# Patient Record
Sex: MALE | Race: BLACK OR AFRICAN AMERICAN | Employment: FULL TIME | URBAN - METROPOLITAN AREA
[De-identification: names, ages, dates, MRNs, and addresses within clinical notes are randomized per-mention and may not be internally consistent; named-entity substitution may affect disease eponyms.]

---

## 2017-01-27 ENCOUNTER — HOSPITAL ENCOUNTER (EMERGENCY)
Age: 22
Discharge: HOME | End: 2017-01-27
Attending: FAMILY MEDICINE

## 2017-01-27 VITALS
RESPIRATION RATE: 18 BRPM | WEIGHT: 270 LBS | HEIGHT: 76 IN | BODY MASS INDEX: 32.88 KG/M2 | HEART RATE: 54 BPM | TEMPERATURE: 98 F | SYSTOLIC BLOOD PRESSURE: 140 MMHG | OXYGEN SATURATION: 98 % | DIASTOLIC BLOOD PRESSURE: 81 MMHG

## 2017-01-27 DIAGNOSIS — R19.7 DIARRHEA, UNSPECIFIED TYPE: Primary | ICD-10-CM

## 2017-01-27 RX ORDER — DIPHENOXYLATE HYDROCHLORIDE AND ATROPINE SULFATE 2.5; .025 MG/1; MG/1
1 TABLET ORAL
Qty: 20 TAB | Refills: 0 | Status: SHIPPED | OUTPATIENT
Start: 2017-01-27 | End: 2017-10-28

## 2017-01-27 NOTE — LETTER
[facility] 
[address] [place] [address] 
[phone] Work/School Note Date: 1/27/2017 To Whom It May concern: 
 
Jennifer Campo was seen and treated today in the emergency room by the following provider(s): 
Attending Provider: Georgette Garcia MD 
Physician Assistant: Nelson Ashraf. Jennifer Campo may return to work on 01/30/17. Sincerely, Nelson Ashraf

## 2017-01-27 NOTE — UC PROVIDER NOTE
Patient is a 25 y.o. male presenting with abdominal pain and diarrhea. The history is provided by the patient. Abdominal Pain    This is a new problem. The current episode started 6 to 12 hours ago. The problem occurs hourly. The problem has been gradually improving. The pain is associated with an unknown factor. The pain is located in the epigastric region. Associated symptoms include diarrhea. Nothing worsens the pain. Past workup includes no CT scan, no ultrasound. His past medical history does not include PUD, gallstones, ulcerative colitis or Crohn's disease. Diarrhea    This is a new problem. The current episode started 6 to 12 hours ago. The problem occurs hourly. The problem has been gradually improving. The pain is associated with an unknown factor. The pain is located in the epigastric region. Associated symptoms include diarrhea. Nothing worsens the pain. The pain is relieved by nothing. Past workup includes no CT scan, no ultrasound. His past medical history does not include PUD, gallstones, ulcerative colitis or Crohn's disease. Past Medical History   Diagnosis Date    Asthma      history of asthma    Recurrent right knee instability 2/23/2015    Tobacco use disorder 2/18/2015        Past Surgical History   Procedure Laterality Date    Hx wrist fracture tx Left      2011         Family History   Problem Relation Age of Onset    Diabetes Sister     Elevated Lipids Sister     Diabetes Maternal Grandmother     Diabetes Maternal Grandfather     Hypertension Maternal Grandfather     No Known Problems Mother     No Known Problems Father         Social History     Social History    Marital status: SINGLE     Spouse name: N/A    Number of children: N/A    Years of education: N/A     Occupational History    Not on file.      Social History Main Topics    Smoking status: Current Some Day Smoker     Types: Cigarettes, Cigars    Smokeless tobacco: Never Used    Alcohol use 0.0 oz/week 0 Standard drinks or equivalent per week      Comment: social    Drug use: No    Sexual activity: Yes     Partners: Female     Other Topics Concern    Not on file     Social History Narrative                ALLERGIES: Review of patient's allergies indicates no known allergies. Review of Systems   Constitutional: Negative for appetite change. Respiratory: Negative for chest tightness. Gastrointestinal: Positive for abdominal pain and diarrhea. Vitals:    01/27/17 1107   BP: 140/81   Pulse: (!) 54   Resp: 18   Temp: 98 °F (36.7 °C)   SpO2: 98%   Weight: 122.5 kg (270 lb)   Height: 6' 4\" (1.93 m)       Physical Exam   Constitutional: He is oriented to person, place, and time. He appears well-developed and well-nourished. Cardiovascular: Normal rate and regular rhythm. Pulmonary/Chest: Effort normal and breath sounds normal.   Abdominal: Soft. Bowel sounds are normal. He exhibits no distension and no mass. There is no tenderness. There is no rebound and no guarding. Neurological: He is alert and oriented to person, place, and time. Skin: Skin is warm and dry. Psychiatric: He has a normal mood and affect. His behavior is normal. Judgment and thought content normal.   Nursing note and vitals reviewed. MDM     Differential Diagnosis; Clinical Impression; Plan:     CLINICAL IMPRESSION:  Diarrhea, unspecified type  (primary encounter diagnosis)    Plan:  1. Lomotil   2.   3.   Risk of Significant Complications, Morbidity, and/or Mortality:   Presenting problems: Moderate  Diagnostic procedures: Moderate  Management options:   Moderate  Progress:   Patient progress:  Stable      Procedures

## 2017-01-27 NOTE — DISCHARGE INSTRUCTIONS
Diarrhea: Care Instructions  Your Care Instructions    Diarrhea is loose, watery stools (bowel movements). The exact cause is often hard to find. Sometimes diarrhea is your body's way of getting rid of what caused an upset stomach. Viruses, food poisoning, and many medicines can cause diarrhea. Some people get diarrhea in response to emotional stress, anxiety, or certain foods. Almost everyone has diarrhea now and then. It usually isn't serious, and your stools will return to normal soon. The important thing to do is replace the fluids you have lost, so you can prevent dehydration. The doctor has checked you carefully, but problems can develop later. If you notice any problems or new symptoms, get medical treatment right away. Follow-up care is a key part of your treatment and safety. Be sure to make and go to all appointments, and call your doctor if you are having problems. It's also a good idea to know your test results and keep a list of the medicines you take. How can you care for yourself at home? · Watch for signs of dehydration, which means your body has lost too much water. Dehydration is a serious condition and should be treated right away. Signs of dehydration are:  ¨ Increasing thirst and dry eyes and mouth. ¨ Feeling faint or lightheaded. ¨ Darker urine, and a smaller amount of urine than normal.  · To prevent dehydration, drink plenty of fluids, enough so that your urine is light yellow or clear like water. Choose water and other caffeine-free clear liquids until you feel better. If you have kidney, heart, or liver disease and have to limit fluids, talk with your doctor before you increase the amount of fluids you drink. · Begin eating small amounts of mild foods the next day, if you feel like it. ¨ Try yogurt that has live cultures of Lactobacillus. (Check the label.)  ¨ Avoid spicy foods, fruits, alcohol, and caffeine until 48 hours after all symptoms are gone.   ¨ Avoid chewing gum that contains sorbitol. ¨ Avoid dairy products (except for yogurt with Lactobacillus) while you have diarrhea and for 3 days after symptoms are gone. · The doctor may recommend that you take over-the-counter medicine, such as loperamide (Imodium), if you still have diarrhea after 6 hours. Read and follow all instructions on the label. Do not use this medicine if you have bloody diarrhea, a high fever, or other signs of serious illness. Call your doctor if you think you are having a problem with your medicine. When should you call for help? Call 911 anytime you think you may need emergency care. For example, call if:  · You passed out (lost consciousness). · Your stools are maroon or very bloody. Call your doctor now or seek immediate medical care if:  · You are dizzy or lightheaded, or you feel like you may faint. · Your stools are black and look like tar, or they have streaks of blood. · You have new or worse belly pain. · You have symptoms of dehydration, such as:  ¨ Dry eyes and a dry mouth. ¨ Passing only a little dark urine. ¨ Feeling thirstier than usual.  · You have a new or higher fever. Watch closely for changes in your health, and be sure to contact your doctor if:  · Your diarrhea is getting worse. · You see pus in the diarrhea. · You are not getting better after 2 days (48 hours). Where can you learn more? Go to http://emilie-levy.info/. Enter N337 in the search box to learn more about \"Diarrhea: Care Instructions. \"  Current as of: May 27, 2016  Content Version: 11.1  © 3846-0290 [employer]. Care instructions adapted under license by [employer] (which disclaims liability or warranty for this information). If you have questions about a medical condition or this instruction, always ask your healthcare professional. [address] any warranty or liability for your use of this information.

## 2017-01-30 LAB
BILIRUB UR QL: NEGATIVE
GLUCOSE UR QL STRIP.AUTO: NEGATIVE MG/DL
KETONES UR-MCNC: NEGATIVE MG/DL
LEUKOCYTE ESTERASE UR QL STRIP: NEGATIVE
NITRITE UR QL: NEGATIVE
PH UR: 6 [PH] (ref 5–8)
PROT UR QL: NEGATIVE MG/DL
RBC # UR STRIP: NEGATIVE /UL
SP GR UR: 1.02 (ref 1–1.03)
UROBILINOGEN UR QL: 0.2 EU/DL (ref 0.2–1)

## 2017-04-10 ENCOUNTER — TELEPHONE (OUTPATIENT)
Dept: FAMILY MEDICINE CLINIC | Age: 22
End: 2017-04-10
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
## 2017-04-13 ENCOUNTER — OFFICE VISIT (OUTPATIENT)
Dept: FAMILY MEDICINE CLINIC | Age: 22
End: 2017-04-13

## 2017-04-13 VITALS
BODY MASS INDEX: 32.03 KG/M2 | RESPIRATION RATE: 24 BRPM | HEART RATE: 54 BPM | SYSTOLIC BLOOD PRESSURE: 112 MMHG | WEIGHT: 263 LBS | DIASTOLIC BLOOD PRESSURE: 64 MMHG | HEIGHT: 76 IN | TEMPERATURE: 98.3 F | OXYGEN SATURATION: 96 %
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
## 2017-04-13 DIAGNOSIS — E78.5 HYPERLIPIDEMIA, UNSPECIFIED HYPERLIPIDEMIA TYPE: ICD-10-CM

## 2017-04-13 DIAGNOSIS — J30.89 NON-SEASONAL ALLERGIC RHINITIS, UNSPECIFIED ALLERGIC RHINITIS TRIGGER: ICD-10-CM

## 2017-04-13 DIAGNOSIS — S22.000D THORACIC COMPRESSION FRACTURE, WITH ROUTINE HEALING, SUBSEQUENT ENCOUNTER: ICD-10-CM

## 2017-04-13 DIAGNOSIS — G47.00 INSOMNIA, UNSPECIFIED TYPE: ICD-10-CM

## 2017-04-13 DIAGNOSIS — R10.13 ABDOMINAL PAIN, EPIGASTRIC: ICD-10-CM

## 2017-04-13 DIAGNOSIS — F43.22 ADJUSTMENT DISORDER WITH ANXIOUS MOOD: ICD-10-CM

## 2017-04-13 RX ORDER — CITALOPRAM 10 MG/1
10 TABLET ORAL DAILY
Qty: 30 TAB | Refills: 3 | Status: SHIPPED | OUTPATIENT
Start: 2017-04-13 | End: 2018-01-11

## 2017-04-13 RX ORDER — OMEPRAZOLE 20 MG/1
20 CAPSULE, DELAYED RELEASE ORAL DAILY
Qty: 30 CAP | Refills: 2 | Status: SHIPPED | OUTPATIENT
Start: 2017-04-13 | End: 2018-01-11

## 2017-04-13 RX ORDER — OXYCODONE HYDROCHLORIDE 5 MG/1
TABLET ORAL
COMMUNITY
Start: 2017-04-08 | End: 2017-10-28

## 2017-04-13 RX ORDER — OXYCODONE AND ACETAMINOPHEN 5; 325 MG/1; MG/1
1 TABLET ORAL
Qty: 50 TAB | Refills: 0 | Status: SHIPPED | OUTPATIENT
Start: 2017-04-13 | End: 2017-05-08 | Stop reason: SDUPTHER

## 2017-04-13 RX ORDER — CETIRIZINE HCL 10 MG
10 TABLET ORAL
Qty: 30 TAB | Refills: 2 | Status: SHIPPED | OUTPATIENT
Start: 2017-04-13 | End: 2018-01-11

## 2017-04-13 RX ORDER — ATORVASTATIN CALCIUM 10 MG/1
10 TABLET, FILM COATED ORAL DAILY
Qty: 30 TAB | Refills: 5 | Status: SHIPPED | OUTPATIENT
Start: 2017-04-13 | End: 2018-01-11
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
## 2017-04-17 ENCOUNTER — OFFICE VISIT (OUTPATIENT)
Dept: FAMILY MEDICINE CLINIC | Age: 22
End: 2017-04-17

## 2017-04-17 VITALS
WEIGHT: 269 LBS | OXYGEN SATURATION: 98 % | TEMPERATURE: 98 F | SYSTOLIC BLOOD PRESSURE: 116 MMHG | BODY MASS INDEX: 32.76 KG/M2 | HEIGHT: 76 IN | RESPIRATION RATE: 22 BRPM | HEART RATE: 53 BPM | DIASTOLIC BLOOD PRESSURE: 70 MMHG
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
## 2017-04-17 DIAGNOSIS — S22.000S THORACIC COMPRESSION FRACTURE, SEQUELA: ICD-10-CM
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
## 2017-05-08 ENCOUNTER — TELEPHONE (OUTPATIENT)
Dept: FAMILY MEDICINE CLINIC | Age: 22
End: 2017-05-08

## 2017-05-08 DIAGNOSIS — S22.000D THORACIC COMPRESSION FRACTURE, WITH ROUTINE HEALING, SUBSEQUENT ENCOUNTER: ICD-10-CM

## 2017-05-08 RX ORDER — OXYCODONE AND ACETAMINOPHEN 5; 325 MG/1; MG/1
1 TABLET ORAL
Qty: 30 TAB | Refills: 0 | Status: SHIPPED | OUTPATIENT
Start: 2017-05-08 | End: 2018-01-11

## 2017-05-08 NOTE — TELEPHONE ENCOUNTER
----- Message from Owen Lynch sent at 5/8/2017 11:22 AM EDT -----  Regarding: Dr. Simran Murray   Patient needs a prescription refill on Oxycodone. Pt can be reached at [phone].

## 2017-05-17 ENCOUNTER — OFFICE VISIT (OUTPATIENT)
Dept: FAMILY MEDICINE CLINIC | Age: 22
End: 2017-05-17

## 2017-05-17 VITALS
OXYGEN SATURATION: 98 % | RESPIRATION RATE: 28 BRPM | SYSTOLIC BLOOD PRESSURE: 128 MMHG | HEART RATE: 83 BPM | BODY MASS INDEX: 31.51 KG/M2 | WEIGHT: 258.8 LBS | TEMPERATURE: 98 F | HEIGHT: 76 IN | DIASTOLIC BLOOD PRESSURE: 82 MMHG

## 2017-05-17 DIAGNOSIS — S22.000D THORACIC COMPRESSION FRACTURE, WITH ROUTINE HEALING, SUBSEQUENT ENCOUNTER: Primary | ICD-10-CM

## 2017-05-17 DIAGNOSIS — M17.31 POST-TRAUMATIC OSTEOARTHRITIS OF RIGHT KNEE: ICD-10-CM

## 2017-05-17 RX ORDER — IBUPROFEN 800 MG/1
800 TABLET ORAL
Qty: 50 TAB | Refills: 2 | Status: SHIPPED | OUTPATIENT
Start: 2017-05-17 | End: 2017-10-28

## 2017-05-17 NOTE — MR AVS SNAPSHOT
Visit Information Date & Time Provider Department Dept. Phone Encounter #  
 5/17/2017 11:00 AM Juan Dickey [phone] [id number] Follow-up Instructions Return in about 4 weeks (around 6/14/2017). Upcoming Health Maintenance Date Due  
 HPV AGE 9Y-34Y (1 of 3 - Male 3 Dose Series) 1/17/2007 INFLUENZA AGE 9 TO ADULT 8/1/2017 DTaP/Tdap/Td series (3 - Td) 4/8/2026 Allergies as of 5/17/2017  Review Complete On: 5/17/2017 By: Yuri Johnson No Known Allergies Current Immunizations  Never Reviewed Name Date Influenza Vaccine (Quad) PF 11/17/2016  2:30 PM  
 Tdap 4/8/2016  2:06 PM  
  
 Not reviewed this visit You Were Diagnosed With   
  
 Codes Comments Thoracic compression fracture, with routine healing, subsequent encounter    -  Primary ICD-10-CM: S22.000D ICD-9-CM: V54.17 Post-traumatic osteoarthritis of right knee     ICD-10-CM: M17.31 
ICD-9-CM: 715.26 Vitals BP Pulse Temp Resp Height(growth percentile) Weight(growth percentile) 128/82 (BP 1 Location: Left arm, BP Patient Position: Sitting) 83 98 °F (36.7 °C) (Oral) 28 6' 4\" (1.93 m) 258 lb 12.8 oz (117.4 kg) SpO2 BMI Smoking Status 98% 31.5 kg/m2 Current Some Day Smoker Vitals History BMI and BSA Data Body Mass Index Body Surface Area 31.5 kg/m 2 2.51 m 2 Preferred Pharmacy Pharmacy Name Phone [id number] [address] [phone] Your Updated Medication List  
  
   
This list is accurate as of: 5/17/17 11:25 AM.  Always use your most recent med list.  
  
  
  
  
 atorvastatin 10 mg tablet Commonly known as:  LIPITOR Take 1 Tab by mouth daily. cetirizine 10 mg tablet Commonly known as:  ZYRTEC Take 1 Tab by mouth daily as needed for Allergies. citalopram 10 mg tablet Commonly known as:  William Flores Take 1 Tab by mouth daily. diphenoxylate-atropine 2.5-0.025 mg per tablet Commonly known as:  LOMOTIL Take 1 Tab by mouth four (4) times daily as needed for Diarrhea (1 tab after each stool for max 8 per day). Max Daily Amount: 4 Tabs. Take after each stool for a maximum of 8 tablets daily  
  
 ibuprofen 800 mg tablet Commonly known as:  MOTRIN Take 1 Tab by mouth every eight (8) hours as needed for Pain. omeprazole 20 mg capsule Commonly known as:  PRILOSEC Take 1 Cap by mouth daily. oxyCODONE IR 5 mg immediate release tablet Commonly known as:  ROXICODONE  
  
 oxyCODONE-acetaminophen 5-325 mg per tablet Commonly known as:  PERCOCET Take 1 Tab by mouth every four (4) hours as needed for Pain. Max Daily Amount: 6 Tabs. traMADol 50 mg tablet Commonly known as:  ULTRAM  
TAKE 1 TABLET BY MOUTH NIGHTLY Prescriptions Sent to Pharmacy Refills  
 ibuprofen (MOTRIN) 800 mg tablet 2 Sig: Take 1 Tab by mouth every eight (8) hours as needed for Pain. Class: Normal  
 Pharmacy: [facility]/pharmacy [id number] [place] [address] S[address] Ph #: [phone] Route: Oral  
  
Follow-up Instructions Return in about 4 weeks (around 6/14/2017). Introducing [facility]! Dear Kate Goncalves: Thank you for requesting a [employer] account. Our records indicate that you already have an active [employer] account. You can access your account anytime at [email or web address] Did you know that you can access your hospital and ER discharge instructions at any time in [employer]? You can also review all of your test results from your hospital stay or ER visit. Additional Information If you have questions, please visit the Frequently Asked Questions section of the [employer] website at [email or web address] Remember, [employer] is NOT to be used for urgent needs. For medical emergencies, dial 911. Now available from your iPhone and Android! Please provide this summary of care documentation to your next provider. Your primary care clinician is listed as Lit Dunham. If you have any questions after today's visit, please call [phone].
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
## 2017-07-19 ENCOUNTER — TELEPHONE (OUTPATIENT)
Dept: FAMILY MEDICINE CLINIC | Age: 22
End: 2017-07-19

## 2017-07-19 RX ORDER — CHLORZOXAZONE 500 MG/1
500 TABLET ORAL
Qty: 40 TAB | Refills: 1 | Status: SHIPPED | OUTPATIENT
Start: 2017-07-19 | End: 2018-01-11

## 2017-07-19 NOTE — TELEPHONE ENCOUNTER
Patient is having back pain from [address] in April , he is unable to come in before 7/26/17 because of his work schedule. He has an appt on 7/26/17 3pm. He is requesting pain medication to help until his appt.  Please contact his mother Osbaldo Rios [phone]

## 2017-10-28 ENCOUNTER — HOSPITAL ENCOUNTER (OUTPATIENT)
Dept: LAB | Age: 22
Discharge: HOME | End: 2017-10-28

## 2017-10-28 ENCOUNTER — HOSPITAL ENCOUNTER (EMERGENCY)
Age: 22
Discharge: HOME | End: 2017-10-28
Attending: FAMILY MEDICINE

## 2017-10-28 VITALS
HEART RATE: 75 BPM | BODY MASS INDEX: 33.66 KG/M2 | DIASTOLIC BLOOD PRESSURE: 79 MMHG | OXYGEN SATURATION: 98 % | SYSTOLIC BLOOD PRESSURE: 164 MMHG | HEIGHT: 73 IN | TEMPERATURE: 98.5 F | WEIGHT: 254 LBS | RESPIRATION RATE: 18 BRPM

## 2017-10-28 DIAGNOSIS — Z11.3 ROUTINE SCREENING FOR STI (SEXUALLY TRANSMITTED INFECTION): ICD-10-CM

## 2017-10-28 DIAGNOSIS — Z20.2 EXPOSURE TO GONORRHEA: Primary | ICD-10-CM

## 2017-10-28 PROCEDURE — 87591 N.GONORRHOEAE DNA AMP PROB: CPT | Performed by: FAMILY MEDICINE

## 2017-10-28 RX ORDER — CEFTRIAXONE 250 MG/8ML
250 INJECTION, POWDER, FOR SOLUTION INTRAMUSCULAR; INTRAVENOUS EVERY 24 HOURS
Status: DISCONTINUED | OUTPATIENT
Start: 2017-10-28 | End: 2017-10-28 | Stop reason: HOSPADM

## 2017-10-28 RX ORDER — AZITHROMYCIN 1 G/1
1 POWDER, FOR SUSPENSION ORAL
Qty: 1 PACKET | Refills: 0 | Status: SHIPPED | OUTPATIENT
Start: 2017-10-28 | End: 2017-10-28

## 2017-10-28 RX ADMIN — CEFTRIAXONE 250 MG: 250 INJECTION, POWDER, FOR SOLUTION INTRAMUSCULAR; INTRAVENOUS at 17:47

## 2017-10-28 NOTE — DISCHARGE INSTRUCTIONS
Exposure to Sexually Transmitted Infections: Care Instructions  Your Care Instructions    Sexually transmitted infections (STIs) are those diseases spread by sexual contact. There are at least 20 different STIs, including chlamydia, gonorrhea, syphilis, and human immunodeficiency virus (HIV), which causes AIDS. Bacteria-caused STIs can be treated and cured. STIs caused by viruses, such as HIV, can be treated but not cured. Some STIs can reduce a woman's chances of getting pregnant in the future. STIs are spread during sexual contact, such as vaginal intercourse and oral or anal sex. Follow-up care is a key part of your treatment and safety. Be sure to make and go to all appointments, and call your doctor if you are having problems. It's also a good idea to know your test results and keep a list of the medicines you take. How can you care for yourself at home? · Your doctor may have given you a shot of antibiotics. If your doctor prescribed antibiotic pills, take them as directed. Do not stop taking them just because you feel better. You need to take the full course of antibiotics. · Do not have sexual contact while you have symptoms of an STI or are being treated for an STI. · Tell your sex partner (or partners) that he or she will need treatment. · If you are a woman, do not douche. Douching changes the normal balance of bacteria in the vagina and may spread an infection up into your reproductive organs. To prevent exposure to STIs in the future  · Use latex condoms every time you have sex. Use them from the beginning to the end of sexual contact. · Talk to your partner before you have sex. Find out if he or she has or is at risk for any STI. Keep in mind that a person may be able to spread an STI even if he or she does not have symptoms. · Do not have sex if you are being treated for an STI. · Do not have sex with anyone who has symptoms of an STI, such as sores on the genitals or mouth.   · Having one sex partner (who does not have STIs and does not have sex with anyone else) is a good way to avoid STIs. When should you call for help? Call your doctor now or seek immediate medical care if:  ? · You have new pain in your belly or pelvis. ? · You have symptoms of a urinary tract infection. These may include:  ¨ Pain or burning when you urinate. ¨ A frequent need to urinate without being able to pass much urine. ¨ Pain in the flank, which is just below the rib cage and above the waist on either side of the back. ¨ Blood in your urine. ¨ A fever. ? · You have new or worsening pain or swelling in the scrotum. ? Watch closely for changes in your health, and be sure to contact your doctor if:  ? · You have unusual vaginal bleeding. ? · You have a discharge from the vagina or penis. ? · You have any new symptoms, such as sores, bumps, rashes, blisters, or warts. ? · You have itching, tingling, pain, or burning in the genital or anal area. ? · You think you may have an STI. Where can you learn more? Go to http://emilie-levy.info/. Enter X046 in the search box to learn more about \"Exposure to Sexually Transmitted Infections: Care Instructions. \"  Current as of: March 20, 2017  Content Version: 11.4  © 1100-9282 [employer]. Care instructions adapted under license by [employer] (which disclaims liability or warranty for this information). If you have questions about a medical condition or this instruction, always ask your healthcare professional. [place] [address] any warranty or liability for your use of this information.
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
## 2017-10-30 LAB
C TRACH DNA SPEC QL NAA+PROBE: NEGATIVE
N GONORRHOEA DNA SPEC QL NAA+PROBE: NEGATIVE
SAMPLE TYPE: NORMAL
SERVICE CMNT-IMP: NORMAL
SPECIMEN SOURCE: NORMAL

## 2018-01-11 ENCOUNTER — HOSPITAL ENCOUNTER (EMERGENCY)
Age: 23
Discharge: HOME | End: 2018-01-11
Attending: FAMILY MEDICINE

## 2018-01-11 VITALS
RESPIRATION RATE: 18 BRPM | BODY MASS INDEX: 32.74 KG/M2 | HEART RATE: 63 BPM | HEIGHT: 73 IN | SYSTOLIC BLOOD PRESSURE: 139 MMHG | DIASTOLIC BLOOD PRESSURE: 70 MMHG | OXYGEN SATURATION: 98 % | TEMPERATURE: 98.4 F | WEIGHT: 247 LBS

## 2018-01-11 DIAGNOSIS — T14.8XXA BRUISE: Primary | ICD-10-CM

## 2018-01-25 NOTE — UC PROVIDER NOTE
Patient is a 26 y.o. male presenting with ecchymosis. The history is provided by the patient. Bleeding/Bruising   This is a new problem. The current episode started more than 2 days ago (noted on back of the thigh ). The problem occurs constantly. The problem has not changed since onset. Pertinent negatives include no chest pain, no abdominal pain and no shortness of breath. Nothing aggravates the symptoms. Nothing relieves the symptoms. He has tried nothing for the symptoms. Past Medical History:   Diagnosis Date    Asthma     history of asthma    Recurrent right knee instability 2/23/2015    Tobacco use disorder 2/18/2015        Past Surgical History:   Procedure Laterality Date    HX WRIST FRACTURE TX Left     2011         Family History   Problem Relation Age of Onset    Diabetes Sister     Elevated Lipids Sister     Diabetes Maternal Grandmother     Diabetes Maternal Grandfather     Hypertension Maternal Grandfather     No Known Problems Mother     No Known Problems Father         Social History     Social History    Marital status: SINGLE     Spouse name: N/A    Number of children: N/A    Years of education: N/A     Occupational History    Not on file. Social History Main Topics    Smoking status: Current Some Day Smoker     Types: Cigarettes, Cigars    Smokeless tobacco: Never Used    Alcohol use 0.0 oz/week     0 Standard drinks or equivalent per week      Comment: social    Drug use: No    Sexual activity: Yes     Partners: Female     Other Topics Concern    Not on file     Social History Narrative                ALLERGIES: Review of patient's allergies indicates no known allergies. Review of Systems   Respiratory: Negative for shortness of breath. Cardiovascular: Negative for chest pain. Gastrointestinal: Negative for abdominal pain. All other systems reviewed and are negative.       Vitals:    01/11/18 1630   BP: 139/70   Pulse: 63   Resp: 18   Temp: 98.4 °F (36.9 °C)   SpO2: 98%   Weight: 112 kg (247 lb)   Height: 6' 1\" (1.854 m)       Physical Exam   Constitutional: He is oriented to person, place, and time. He appears well-developed and well-nourished. HENT:   Head: Normocephalic and atraumatic. Right Ear: External ear normal.   Left Ear: External ear normal.   Mouth/Throat: Oropharynx is clear and moist. No oropharyngeal exudate. Eyes: Conjunctivae and EOM are normal. Pupils are equal, round, and reactive to light. Right eye exhibits no discharge. Left eye exhibits no discharge. No scleral icterus. Neck: Normal range of motion. Neck supple. No tracheal deviation present. No thyromegaly present. Cardiovascular: Normal rate, regular rhythm, normal heart sounds and intact distal pulses. No murmur heard. Pulmonary/Chest: Effort normal and breath sounds normal. No respiratory distress. He has no wheezes. He has no rales. Abdominal: Soft. Bowel sounds are normal. He exhibits no distension. There is no tenderness. There is no rebound and no guarding. Musculoskeletal: Normal range of motion. He exhibits no edema or tenderness. Lymphadenopathy:     He has no cervical adenopathy. Neurological: He is alert and oriented to person, place, and time. No cranial nerve deficit. Coordination normal.   Skin: Skin is warm. Bruising ( on posterior thigh- no blanching- no tenderness) noted. No abrasion, no petechiae and no rash noted. Psychiatric: He has a normal mood and affect. His behavior is normal. Judgment and thought content normal.   Nursing note and vitals reviewed.       MDM     Differential Diagnosis; Clinical Impression; Plan:     CLINICAL IMPRESSION:  Bruise  (primary encounter diagnosis)      DDX    Plan:    Reassured- observe  Needs lab with PCP   If sxs worsen go to Ed   Amount and/or Complexity of Data Reviewed:    Review and summarize past medical records:  Yes  Risk of Significant Complications, Morbidity, and/or Mortality:   Presenting problems: Moderate  Management options:   Moderate  Progress:   Patient progress:  Stable      Procedures

## 2018-05-12 ENCOUNTER — OFFICE VISIT (OUTPATIENT)
Dept: URGENT CARE | Age: 23
End: 2018-05-12

## 2018-05-12 VITALS
RESPIRATION RATE: 20 BRPM | WEIGHT: 229 LBS | SYSTOLIC BLOOD PRESSURE: 140 MMHG | HEART RATE: 60 BPM | OXYGEN SATURATION: 97 % | DIASTOLIC BLOOD PRESSURE: 60 MMHG | TEMPERATURE: 98 F | HEIGHT: 76 IN | BODY MASS INDEX: 27.89 KG/M2

## 2018-05-12 DIAGNOSIS — Z20.2 STD EXPOSURE: ICD-10-CM

## 2018-05-12 DIAGNOSIS — Z20.2 EXPOSURE TO GONORRHEA: Primary | ICD-10-CM

## 2018-05-12 RX ORDER — AZITHROMYCIN 250 MG/1
1000 TABLET, FILM COATED ORAL
Qty: 4 TAB | Refills: 0 | Status: SHIPPED | OUTPATIENT
Start: 2018-05-12 | End: 2018-05-12

## 2018-05-12 RX ORDER — CEFTRIAXONE 250 MG/8ML
250 INJECTION, POWDER, FOR SOLUTION INTRAMUSCULAR; INTRAVENOUS ONCE
Status: COMPLETED | OUTPATIENT
Start: 2018-05-12 | End: 2018-05-12

## 2018-05-12 RX ADMIN — CEFTRIAXONE 250 MG: 250 INJECTION, POWDER, FOR SOLUTION INTRAMUSCULAR; INTRAVENOUS at 17:03

## 2018-05-12 NOTE — PROGRESS NOTES
HPI Comments:   Girlfriend tested positive for gonorrhea. He has had unprotected sexual contact with her. Denies symptoms but is requesting treatment today. Patient is a 26 y.o. male presenting with STD exposure. Exposure to STD          Past Medical History:   Diagnosis Date    Asthma     history of asthma    Recurrent right knee instability 2/23/2015    Tobacco use disorder 2/18/2015        Past Surgical History:   Procedure Laterality Date    HX WRIST FRACTURE TX Left     2011         Family History   Problem Relation Age of Onset    Diabetes Sister     Elevated Lipids Sister     Diabetes Maternal Grandmother     Diabetes Maternal Grandfather     Hypertension Maternal Grandfather     No Known Problems Mother     No Known Problems Father         Social History     Social History    Marital status: SINGLE     Spouse name: N/A    Number of children: N/A    Years of education: N/A     Occupational History    Not on file. Social History Main Topics    Smoking status: Current Some Day Smoker     Types: Cigarettes, Cigars    Smokeless tobacco: Never Used    Alcohol use 0.0 oz/week     0 Standard drinks or equivalent per week      Comment: social    Drug use: No    Sexual activity: Yes     Partners: Female     Other Topics Concern    Not on file     Social History Narrative                ALLERGIES: Review of patient's allergies indicates no known allergies. Review of Systems   Constitutional: Negative for chills, fatigue and fever. Genitourinary: Negative for decreased urine volume, dysuria, hematuria, penile pain, scrotal swelling, testicular pain and urgency. Vitals:    05/12/18 1559   BP: 140/60   Pulse: 60   Resp: 20   Temp: 98 °F (36.7 °C)   SpO2: 97%   Weight: 229 lb (103.9 kg)   Height: 6' 4\" (1.93 m)       Physical Exam   Constitutional: He is oriented to person, place, and time. No distress.    HENT:   Mouth/Throat: Oropharynx is clear and moist.   Eyes: EOM are normal.   Cardiovascular: Normal rate, regular rhythm and normal heart sounds. Exam reveals no gallop and no friction rub. No murmur heard. Pulmonary/Chest: Effort normal and breath sounds normal.   Genitourinary:   Genitourinary Comments: Deferred  exam today   Neurological: He is alert and oriented to person, place, and time. Skin: He is not diaphoretic. MDM     Differential Diagnosis; Clinical Impression; Plan:       CLINICAL IMPRESSION:  (Z20.2) Exposure to gonorrhea  (primary encounter diagnosis)  (Z20.2) STD exposure    Orders Placed This Encounter      CHLAMYDIA/GC PCR\  RX      cefTRIAXone (ROCEPHIN) injection 250 mg      azithromycin (ZITHROMAX) 250 mg tablet      Plan:  1. Reviewed safe sex practices  2. See above orders  3. Call our clinic in 3-4 days for results. 4. See PCP to discuss any further need for blood screening      We have reviewed concerning signs/symptoms, normal vs abnormal progression of medical condition and when to seek immediate medical attention. Schedule with PCP or Urgent Care immediately for worsening or new symptoms.     Risk of Significant Complications, Morbidity, and/or Mortality:   Presenting problems:  Minimal  Diagnostic procedures:  Minimal  Management options:  Minimal  Progress:   Patient progress:  Stable      Procedures

## 2018-05-12 NOTE — PATIENT INSTRUCTIONS
Follow up with health department or primary care doctor to discuss any further need for blood testing     Safer Sex: Care Instructions  Your Care Instructions  Safer sex is a way to reduce your risk of getting an infection spread through sex. It can also help prevent pregnancy. Most infections that are spread through sex, also called sexually transmitted infections or STIs, can be cured. But some can decrease your chances of getting pregnant if they are not treated early. Others, such as herpes, have no cure. And some, such as HIV, can be deadly. Several products can help you practice safer sex and reduce your chance of STIs. One of the best is a condom. There are condoms for men and for women. The female condom is a tube of soft plastic with a closed end that is placed deep into the vagina. You can use a special rubber sheet (dental dam) for protection during oral sex. Latex gloves can keep your hands from touching blood, semen, or other body fluids that can carry infections. Remember that birth control methods such as diaphragms, IUDs, foams, and birth control pills do not stop you from getting STIs. Follow-up care is a key part of your treatment and safety. Be sure to make and go to all appointments, and call your doctor if you are having problems. It's also a good idea to know your test results and keep a list of the medicines you take. How can you care for yourself at home? · Think about getting shots to prevent hepatitis A and hepatitis B. These two diseases can be spread through sex. You also can get hepatitis A if you eat infected food. · Use condoms or female condoms each time and every time you have sex. · Learn the right way to use a male condom:  ¨ Condoms come in several sizes. Make sure you use the right size. A condom that is too small can break easily. A condom that is too big can slip off during sex. Use a new condom each time you have sex.   ¨ Be careful not to poke a hole in the condom when you open the wrapper. ¨ Squeeze the tip of the condom to keep out air. ¨ Pull down the loose skin (foreskin) from the head of an uncircumcised penis. ¨ While squeezing the tip of the condom, unroll it all the way down to the base of the firm penis. ¨ Never use petroleum jelly (such as Vaseline), grease, hand lotion, baby oil, or anything with oil in it. These products can make holes in the condom. ¨ After sex, hold the condom on your penis as you remove your penis from your partner. This will keep semen from spilling out of the condom. · Learn to use a female condom:  ¨ You can put in a female condom up to 8 hours before sex. ¨ Squeeze the smaller ring at the closed end and insert it deep into the vagina. The larger ring at the open end should stay outside the vagina. ¨ During sex, make sure the penis goes into the condom. ¨ After the penis is removed, close the open end of the condom by twisting it. Remove the condom. · Do not use a female condom and male condom at the same time. · Do not have sex with anyone who has symptoms of an STI, such as sores on the genitals or mouth. The herpes virus that causes cold sores can spread to and from the penis and vagina. · Do not drink a lot of alcohol or use drugs before sex. This can cause you to let down your guard and not practice safer sex. · Having one sex partner (who does not have STIs and does not have sex with anyone else) is a sure way to avoid STIs. · Talk to your partner before you have sex. Find out if he or she has or is at risk for any STI. Keep in mind that a person may be able to spread an STI even if he or she does not have symptoms. You and your partner may want to get an HIV test. You should get tested again 6 months later. Where can you learn more? Go to http://emilie-levy.info/. Enter V944 in the search box to learn more about \"Safer Sex: Care Instructions. \"  Current as of: March 20, 2017  Content Version: 11.4  © 7444-7159 Healthwise, Incorporated. Care instructions adapted under license by [employer] (which disclaims liability or warranty for this information). If you have questions about a medical condition or this instruction, always ask your healthcare professional. [address] any warranty or liability for your use of this information.

## 2018-05-12 NOTE — MR AVS SNAPSHOT
[address] [place] [address] 
[phone] Patient: Rusty Braden MRN: [id number]:[id number] Visit Information Date & Time Provider Department Dept. Phone Encounter #  
 5/12/2018  3:30 PM [address] [facility] [phone] [id number] Upcoming Health Maintenance Date Due Influenza Age 5 to Adult 8/1/2018 DTaP/Tdap/Td series (3 - Td) 4/8/2026 Allergies as of 5/12/2018  Review Complete On: 5/12/2018 By: Kehinde Wheat RN No Known Allergies Current Immunizations  Never Reviewed Name Date Influenza Vaccine (Quad) PF 11/17/2016  2:30 PM  
 Tdap 4/8/2016  2:06 PM  
  
 Not reviewed this visit You Were Diagnosed With   
  
 Codes Comments Exposure to gonorrhea    -  Primary ICD-10-CM: Z20.2 ICD-9-CM: V01.6 STD exposure     ICD-10-CM: Z20.2 ICD-9-CM: V01.6 Vitals BP Pulse Temp Resp Height(growth percentile) Weight(growth percentile) 140/60 60 98 °F (36.7 °C) 20 6' 4\" (1.93 m) 229 lb (103.9 kg) SpO2 BMI Smoking Status 97% 27.87 kg/m2 Current Some Day Smoker BMI and BSA Data Body Mass Index Body Surface Area  
 27.87 kg/m 2 2.36 m 2 Preferred Pharmacy Pharmacy Name Phone [id number] [address] [phone] Your Updated Medication List  
  
   
This list is accurate as of 5/12/18  5:01 PM.  Always use your most recent med list.  
  
  
  
  
 azithromycin 250 mg tablet Commonly known as:  Marlinda Jameel Take 4 Tabs by mouth now for 1 dose. Prescriptions Sent to Pharmacy Refills  
 azithromycin (ZITHROMAX) 250 mg tablet 0 Sig: Take 4 Tabs by mouth now for 1 dose. Class: Normal  
 [facility] [id number] [facility] [address]  #: [phone]  Route: Oral  
  
 We Performed the Following CHLAMYDIA/GC PCR [80473 CPT(R)] Patient Instructions Follow up with health department or primary care doctor to discuss any further need for blood testing Safer Sex: Care Instructions Your Care Instructions Safer sex is a way to reduce your risk of getting an infection spread through sex. It can also help prevent pregnancy. Most infections that are spread through sex, also called sexually transmitted infections or STIs, can be cured. But some can decrease your chances of getting pregnant if they are not treated early. Others, such as herpes, have no cure. And some, such as HIV, can be deadly. Several products can help you practice safer sex and reduce your chance of STIs. One of the best is a condom. There are condoms for men and for women. The female condom is a tube of soft plastic with a closed end that is placed deep into the vagina. You can use a special rubber sheet (dental dam) for protection during oral sex. Latex gloves can keep your hands from touching blood, semen, or other body fluids that can carry infections. Remember that birth control methods such as diaphragms, IUDs, foams, and birth control pills do not stop you from getting STIs. Follow-up care is a key part of your treatment and safety. Be sure to make and go to all appointments, and call your doctor if you are having problems. It's also a good idea to know your test results and keep a list of the medicines you take. How can you care for yourself at home? · Think about getting shots to prevent hepatitis A and hepatitis B. These two diseases can be spread through sex. You also can get hepatitis A if you eat infected food. · Use condoms or female condoms each time and every time you have sex. · Learn the right way to use a male condom: ¨ Condoms come in several sizes. Make sure you use the right size. A condom that is too small can break easily.  A condom that is too big can slip off during sex. Use a new condom each time you have sex. ¨ Be careful not to poke a hole in the condom when you open the wrapper. ¨ Squeeze the tip of the condom to keep out air. ¨ Pull down the loose skin (foreskin) from the head of an uncircumcised penis. ¨ While squeezing the tip of the condom, unroll it all the way down to the base of the firm penis. ¨ Never use petroleum jelly (such as Vaseline), grease, hand lotion, baby oil, or anything with oil in it. These products can make holes in the condom. ¨ After sex, hold the condom on your penis as you remove your penis from your partner. This will keep semen from spilling out of the condom. · Learn to use a female condom: 
¨ You can put in a female condom up to 8 hours before sex. ¨ Squeeze the smaller ring at the closed end and insert it deep into the vagina. The larger ring at the open end should stay outside the vagina. ¨ During sex, make sure the penis goes into the condom. ¨ After the penis is removed, close the open end of the condom by twisting it. Remove the condom. · Do not use a female condom and male condom at the same time. · Do not have sex with anyone who has symptoms of an STI, such as sores on the genitals or mouth. The herpes virus that causes cold sores can spread to and from the penis and vagina. · Do not drink a lot of alcohol or use drugs before sex. This can cause you to let down your guard and not practice safer sex. · Having one sex partner (who does not have STIs and does not have sex with anyone else) is a sure way to avoid STIs. · Talk to your partner before you have sex. Find out if he or she has or is at risk for any STI. Keep in mind that a person may be able to spread an STI even if he or she does not have symptoms. You and your partner may want to get an HIV test. You should get tested again 6 months later. Where can you learn more? Go to http://emilie-levy.info/. Enter R855 in the search box to learn more about \"Safer Sex: Care Instructions. \" Current as of: March 20, 2017 Content Version: 11.4 © 8491-7301 [employer]. Care instructions adapted under license by [employer] (which disclaims liability or warranty for this information). If you have questions about a medical condition or this instruction, always ask your healthcare professional. [address] any warranty or liability for your use of this information. Introducing [facility]! Dear Vasiliy Silverio: Thank you for requesting a Ub[employer] account. Our records indicate that you already have an active Ubitexx account. You can access your account anytime at [email or web address] Did you know that you can access your hospital and ER discharge instructions at any time in Ubitexx? You can also review all of your test results from your hospital stay or ER visit. Additional Information If you have questions, please visit the Frequently Asked Questions section of the Ub[employer]xx website at [email or web address] Remember, Ub[employer]xx is NOT to be used for urgent needs. For medical emergencies, dial 911. Now available from your iPhone and Android! Please provide this summary of care documentation to your next provider. Your primary care clinician is listed as Adam Lyn. If you have any questions after today's visit, please call [phone].

## 2018-05-14 LAB
C TRACH RRNA SPEC QL NAA+PROBE: NORMAL
N GONORRHOEA RRNA SPEC QL NAA+PROBE: NORMAL

## 2018-05-14 NOTE — PROGRESS NOTES
He is not a patient at [facility]. He was seen at [facility] on 5/12/18. Unsure of why results were canceled. And looks as if his PCP is Dr. Boyd Contreras.

## 2018-10-12 ENCOUNTER — OFFICE VISIT (OUTPATIENT)
Dept: FAMILY MEDICINE CLINIC | Age: 23
End: 2018-10-12

## 2018-10-12 VITALS
WEIGHT: 217.4 LBS | HEIGHT: 76 IN | TEMPERATURE: 97.6 F | OXYGEN SATURATION: 99 % | RESPIRATION RATE: 18 BRPM | SYSTOLIC BLOOD PRESSURE: 120 MMHG | DIASTOLIC BLOOD PRESSURE: 66 MMHG | BODY MASS INDEX: 26.47 KG/M2 | HEART RATE: 50 BPM

## 2018-10-12 DIAGNOSIS — M54.50 CHRONIC MIDLINE LOW BACK PAIN WITHOUT SCIATICA: Primary | ICD-10-CM

## 2018-10-12 DIAGNOSIS — R23.3 ECCHYMOSES, SPONTANEOUS: ICD-10-CM

## 2018-10-12 DIAGNOSIS — S22.000S CLOSED COMPRESSION FRACTURE OF THORACIC VERTEBRA, SEQUELA: ICD-10-CM

## 2018-10-12 DIAGNOSIS — G89.29 CHRONIC MIDLINE LOW BACK PAIN WITHOUT SCIATICA: Primary | ICD-10-CM

## 2018-10-12 RX ORDER — DICLOFENAC SODIUM 75 MG/1
75 TABLET, DELAYED RELEASE ORAL
Qty: 30 TAB | Refills: 3 | Status: SHIPPED | OUTPATIENT
Start: 2018-10-12 | End: 2022-03-22
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
## 2018-10-12 NOTE — MR AVS SNAPSHOT
[address] [facility] 
 
 
 [address] [facility] [address] [id number] [phone] Patient: Janine Rothman MRN: [id number] Visit Information Date & Time Provider Department Dept. Phone Encounter #  
 10/12/2018  3:45 PM Elan Luke, [address] [phone] [id number] Follow-up Instructions Return in about 3 months (around 1/12/2019). Upcoming Health Maintenance Date Due Influenza Age 5 to Adult 3/31/2019* DTaP/Tdap/Td series (3 - Td) 4/8/2026 *Topic was postponed. The date shown is not the original due date. Allergies as of 10/12/2018  Review Complete On: 10/12/2018 By: Arnoldo Rogers No Known Allergies Current Immunizations  Never Reviewed Name Date Influenza Vaccine (Quad) PF 11/17/2016  2:30 PM  
 Tdap 4/8/2016  2:06 PM  
  
 Not reviewed this visit You Were Diagnosed With   
  
 Codes Comments Chronic midline low back pain without sciatica    -  Primary ICD-10-CM: M54.5, G89.29 ICD-9-CM: 724.2, 338.29 Closed compression fracture of thoracic vertebra, sequela     ICD-10-CM: S22.000S ICD-9-CM: 905.1 Ecchymoses, spontaneous     ICD-10-CM: R23.3 ICD-9-CM: 592. 7 Vitals BP Pulse Temp Resp Height(growth percentile) Weight(growth percentile) 120/66 (BP 1 Location: Left arm, BP Patient Position: Sitting) (!) 50 97.6 °F (36.4 °C) (Oral) 18 6' 4\" (1.93 m) 217 lb 6.4 oz (98.6 kg) SpO2 BMI Smoking Status 99% 26.46 kg/m2 Current Some Day Smoker Vitals History BMI and BSA Data Body Mass Index Body Surface Area  
 26.46 kg/m 2 2.3 m 2 Preferred Pharmacy Pharmacy Name Phone [facility]/PHARMACY #[id number] [address] [phone] Your Updated Medication List  
  
   
This list is accurate as of 10/12/18  4:22 PM.  Always use your most recent med list.  
  
  
  
  
 diclofenac EC 75 mg EC tablet Commonly known as:  VOLTAREN Take 1 Tab by mouth two (2) times daily as needed. Prescriptions Sent to Pharmacy Refills  
 diclofenac EC (VOLTAREN) 75 mg EC tablet 3 Sig: Take 1 Tab by mouth two (2) times daily as needed. Class: Normal  
 [facility] [id number] [facility] [address]  #: [phone] Route: Oral  
  
We Performed the Following CBC WITH AUTOMATED DIFF [14172 CPT(R)] METABOLIC PANEL, COMPREHENSIVE [65626 CPT(R)] PROTHROMBIN TIME + INR [13089 CPT(R)] PTT [id number] CPT(R)] Follow-up Instructions Return in about 3 months (around 1/12/2019). Introducing [facility]! Dear Davida Cortés: Thank you for requesting a [employer] account. Our records indicate that you already have an active [employer] account. You can access your account anytime at [email or web address] Did you know that you can access your hospital and ER discharge instructions at any time in [employer]s? You can also review all of your test results from your hospital stay or ER visit. Additional Information If you have questions, please visit the Frequently Asked Questions section of the [employer] website at [email or web address] Remember, [employer] is NOT to be used for urgent needs. For medical emergencies, dial 911. Now available from your iPhone and Android! Please provide this summary of care documentation to your next provider. Your primary care clinician is listed as Ricardo Mackay. If you have any questions after today's visit, please call [phone].

## 2018-10-14 LAB
ALBUMIN SERPL-MCNC: 4 G/DL (ref 3.5–5.5)
ALBUMIN/GLOB SERPL: 1.5 {RATIO} (ref 1.2–2.2)
ALP SERPL-CCNC: 85 IU/L (ref 39–117)
ALT SERPL-CCNC: 27 IU/L (ref 0–44)
APTT PPP: 28 SEC (ref 24–33)
AST SERPL-CCNC: 32 IU/L (ref 0–40)
BASOPHILS # BLD AUTO: 0 X10E3/UL (ref 0–0.2)
BASOPHILS NFR BLD AUTO: 0 %
BILIRUB SERPL-MCNC: 0.5 MG/DL (ref 0–1.2)
BUN SERPL-MCNC: 11 MG/DL (ref 6–20)
BUN/CREAT SERPL: 12 (ref 9–20)
CALCIUM SERPL-MCNC: 9.1 MG/DL (ref 8.7–10.2)
CHLORIDE SERPL-SCNC: 107 MMOL/L (ref 96–106)
CO2 SERPL-SCNC: 22 MMOL/L (ref 20–29)
CREAT SERPL-MCNC: 0.9 MG/DL (ref 0.76–1.27)
EOSINOPHIL # BLD AUTO: 0.2 X10E3/UL (ref 0–0.4)
EOSINOPHIL NFR BLD AUTO: 3 %
ERYTHROCYTE [DISTWIDTH] IN BLOOD BY AUTOMATED COUNT: 14.2 % (ref 12.3–15.4)
GLOBULIN SER CALC-MCNC: 2.7 G/DL (ref 1.5–4.5)
GLUCOSE SERPL-MCNC: 93 MG/DL (ref 65–99)
HCT VFR BLD AUTO: 43.6 % (ref 37.5–51)
HGB BLD-MCNC: 14 G/DL (ref 13–17.7)
IMM GRANULOCYTES # BLD: 0 X10E3/UL (ref 0–0.1)
IMM GRANULOCYTES NFR BLD: 0 %
INR PPP: 1.1 (ref 0.8–1.2)
LYMPHOCYTES # BLD AUTO: 3.7 X10E3/UL (ref 0.7–3.1)
LYMPHOCYTES NFR BLD AUTO: 48 %
MCH RBC QN AUTO: 27.4 PG (ref 26.6–33)
MCHC RBC AUTO-ENTMCNC: 32.1 G/DL (ref 31.5–35.7)
MCV RBC AUTO: 85 FL (ref 79–97)
MONOCYTES # BLD AUTO: 0.6 X10E3/UL (ref 0.1–0.9)
MONOCYTES NFR BLD AUTO: 8 %
NEUTROPHILS # BLD AUTO: 3.1 X10E3/UL (ref 1.4–7)
NEUTROPHILS NFR BLD AUTO: 41 %
PLATELET # BLD AUTO: 200 X10E3/UL (ref 150–379)
POTASSIUM SERPL-SCNC: 4.1 MMOL/L (ref 3.5–5.2)
PROT SERPL-MCNC: 6.7 G/DL (ref 6–8.5)
PROTHROMBIN TIME: 10.9 SEC (ref 9.1–12)
RBC # BLD AUTO: 5.11 X10E6/UL (ref 4.14–5.8)
SODIUM SERPL-SCNC: 143 MMOL/L (ref 134–144)
WBC # BLD AUTO: 7.6 X10E3/UL (ref 3.4–10.8)

## 2018-11-20 ENCOUNTER — OFFICE VISIT (OUTPATIENT)
Dept: FAMILY MEDICINE CLINIC | Age: 23
End: 2018-11-20

## 2018-11-20 VITALS
OXYGEN SATURATION: 98 % | DIASTOLIC BLOOD PRESSURE: 68 MMHG | WEIGHT: 217.8 LBS | BODY MASS INDEX: 26.52 KG/M2 | SYSTOLIC BLOOD PRESSURE: 108 MMHG | TEMPERATURE: 98 F | HEIGHT: 76 IN | HEART RATE: 60 BPM | RESPIRATION RATE: 20 BRPM
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
## 2018-11-20 DIAGNOSIS — S06.0X1S CONCUSSION WITH LOSS OF CONSCIOUSNESS OF 30 MINUTES OR LESS, SEQUELA (HCC): ICD-10-CM

## 2018-11-20 DIAGNOSIS — S33.5XXD LUMBAR SPRAIN, SUBSEQUENT ENCOUNTER: ICD-10-CM

## 2018-11-20 RX ORDER — IBUPROFEN 600 MG/1
600 TABLET ORAL
Qty: 60 TAB | Refills: 1 | Status: SHIPPED | OUTPATIENT
Start: 2018-11-20 | End: 2022-03-22
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
## 2018-11-20 NOTE — PROGRESS NOTES
Chief Complaint Patient presents with  
[facility] Follow Up  
  F/U from [address] [place] [address] for [address] 11-13-18.

## 2018-11-20 NOTE — LETTER
NOTIFICATION OF RETURN TO WORK / SCHOOL 
 
11/20/2018 3:32 PM 
 
Mr. Alina Bay [address] [facility] To Whom It May Concern: 
 
Alina Bay was under the care of [facility] from 11/20/18. He will be able to return to work/school on 11/28/18 with no restrictions. If there are questions or concerns please have the patient contact our office. Sincerely, Jeri Segura MD

## 2018-11-23 ENCOUNTER — TELEPHONE (OUTPATIENT)
Dept: FAMILY MEDICINE CLINIC | Age: 23
End: 2018-11-23

## 2018-11-27 ENCOUNTER — OFFICE VISIT (OUTPATIENT)
Dept: FAMILY MEDICINE CLINIC | Age: 23
End: 2018-11-27

## 2018-11-27 VITALS
WEIGHT: 222.8 LBS | BODY MASS INDEX: 27.13 KG/M2 | RESPIRATION RATE: 20 BRPM | OXYGEN SATURATION: 98 % | HEIGHT: 76 IN | HEART RATE: 73 BPM | TEMPERATURE: 98.2 F | DIASTOLIC BLOOD PRESSURE: 84 MMHG | SYSTOLIC BLOOD PRESSURE: 122 MMHG
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
## 2018-11-27 DIAGNOSIS — S06.0X1S CONCUSSION WITH LOSS OF CONSCIOUSNESS OF 30 MINUTES OR LESS, SEQUELA (HCC): ICD-10-CM

## 2018-11-27 DIAGNOSIS — S33.5XXD LUMBAR SPRAIN, SUBSEQUENT ENCOUNTER: ICD-10-CM

## 2018-11-27 NOTE — LETTER
NOTIFICATION OF RETURN TO WORK / SCHOOL 
 
11/27/2018 2:26 PM 
 
Mr. Robby Prater [address] [facility] To Whom It May Concern: 
 
Robby Prater was under the care of [facility] from 11/13/18. He will be able to return to work/school on 11/29/18 with no restrictions. If there are questions or concerns please have the patient contact our office. Sincerely, Jolanta Aviles MD
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
## 2019-09-11 ENCOUNTER — OFFICE VISIT (OUTPATIENT)
Dept: FAMILY MEDICINE CLINIC | Age: 24
End: 2019-09-11

## 2019-09-11 VITALS
HEART RATE: 49 BPM | BODY MASS INDEX: 26.96 KG/M2 | RESPIRATION RATE: 20 BRPM | SYSTOLIC BLOOD PRESSURE: 112 MMHG | DIASTOLIC BLOOD PRESSURE: 72 MMHG | HEIGHT: 76 IN | OXYGEN SATURATION: 98 % | TEMPERATURE: 98.5 F | WEIGHT: 221.4 LBS

## 2019-09-11 DIAGNOSIS — S33.5XXD LUMBAR SPRAIN, SUBSEQUENT ENCOUNTER: Primary | ICD-10-CM

## 2019-09-11 RX ORDER — CYCLOBENZAPRINE HCL 10 MG
10 TABLET ORAL
Qty: 30 TAB | Refills: 1 | Status: SHIPPED | OUTPATIENT
Start: 2019-09-11 | End: 2020-02-04

## 2019-09-11 RX ORDER — PREDNISONE 10 MG/1
10 TABLET ORAL 2 TIMES DAILY
Qty: 10 TAB | Refills: 0 | Status: SHIPPED | OUTPATIENT
Start: 2019-09-11 | End: 2021-07-28 | Stop reason: ALTCHOICE

## 2019-09-11 NOTE — LETTER
NOTIFICATION OF RETURN TO WORK / SCHOOL 
 
9/11/2019 3:19 PM 
 
Mr. Raine Clark [address] [facility] [place] To Whom It May Concern: 
 
Raine Clark was under the care of [facility] from 9/10/19 He will be able to return to work/school on 9/15/19 with no restrictions. If there are questions or concerns please have the patient contact our office. Sincerely, Krys Malagon MD

## 2019-09-11 NOTE — PROGRESS NOTES
HISTORY OF PRESENT ILLNESS  Sally Fraser is a 22 y.o. male. Low lumbar back pain x 3 days,aggravated by work. No apparent new injury  Back Pain    The history is provided by the patient. This is a recurrent problem. The current episode started more than 2 days ago. The problem has not changed since onset. The problem occurs hourly. The pain is associated with no known injury. The pain is present in the lower back. The pain does not radiate. The pain is at a severity of 5/10. The pain is moderate. The symptoms are aggravated by certain positions, twisting and bending. The pain is worse during the day. Pertinent negatives include no fever, no abdominal pain, no paresthesias, no paresis, no tingling and no weakness. Review of Systems   Constitutional: Negative for fever and malaise/fatigue. Gastrointestinal: Negative for abdominal pain. Genitourinary: Negative for frequency. Musculoskeletal: Positive for back pain. Negative for myalgias. Neurological: Negative for tingling, focal weakness, weakness and paresthesias. Physical Exam   Constitutional: He appears well-developed and well-nourished. HENT:   Head: Normocephalic and atraumatic. Right Ear: External ear normal.   Left Ear: External ear normal.   Nose: Nose normal.   Mouth/Throat: Oropharynx is clear and moist.   Cardiovascular: Normal rate and regular rhythm. Musculoskeletal:        Lumbar back: He exhibits decreased range of motion, tenderness and spasm. He exhibits no deformity (.wfmba). Back:    SLRs 90/90,DTRs normal and symmetrical         ASSESSMENT and PLAN  Diagnoses and all orders for this visit:    1. Lumbar sprain, subsequent encounter,recommend rest heat  -     predniSONE (DELTASONE) 10 mg tablet; Take 10 mg by mouth two (2) times a day. -     cyclobenzaprine (FLEXERIL) 10 mg tablet; Take 1 Tab by mouth three (3) times daily as needed for Muscle Spasm(s).

## 2020-02-03 DIAGNOSIS — S33.5XXD LUMBAR SPRAIN, SUBSEQUENT ENCOUNTER: ICD-10-CM

## 2020-02-04 RX ORDER — CYCLOBENZAPRINE HCL 10 MG
TABLET ORAL
Qty: 30 TAB | Refills: 1 | Status: SHIPPED | OUTPATIENT
Start: 2020-02-04 | End: 2022-03-22

## 2021-07-28 ENCOUNTER — OFFICE VISIT (OUTPATIENT)
Dept: FAMILY MEDICINE CLINIC | Age: 26
End: 2021-07-28
Payer: COMMERCIAL

## 2021-07-28 VITALS
BODY MASS INDEX: 28.67 KG/M2 | RESPIRATION RATE: 18 BRPM | TEMPERATURE: 98 F | SYSTOLIC BLOOD PRESSURE: 132 MMHG | OXYGEN SATURATION: 98 % | HEART RATE: 58 BPM | HEIGHT: 76 IN | WEIGHT: 235.4 LBS | DIASTOLIC BLOOD PRESSURE: 78 MMHG

## 2021-07-28 DIAGNOSIS — S33.5XXA LUMBAR SPRAIN, INITIAL ENCOUNTER: Primary | ICD-10-CM

## 2021-07-28 PROCEDURE — 99213 OFFICE O/P EST LOW 20 MIN: CPT | Performed by: FAMILY MEDICINE

## 2021-07-28 RX ORDER — DICLOFENAC SODIUM 75 MG/1
75 TABLET, DELAYED RELEASE ORAL 2 TIMES DAILY WITH MEALS
Qty: 20 TABLET | Refills: 1 | Status: SHIPPED | OUTPATIENT
Start: 2021-07-28 | End: 2022-03-22

## 2021-07-28 RX ORDER — CYCLOBENZAPRINE HCL 10 MG
10 TABLET ORAL
Qty: 30 TABLET | Refills: 1 | Status: SHIPPED | OUTPATIENT
Start: 2021-07-28 | End: 2022-03-22

## 2021-07-28 NOTE — PROGRESS NOTES
HISTORY OF PRESENT ILLNESS  Anival Rader is a 23 y.o. male. 4 days nonradiating low back pain l >r,aggravated by job as   Back Pain   The history is provided by the patient. This is a new problem. The problem has not changed since onset. The problem occurs hourly. The pain is associated with no known injury. The pain is present in the lower back. The quality of the pain is described as aching. The pain does not radiate. The pain is at a severity of 5/10. The pain is worse during the day. Pertinent negatives include no fever, no bladder incontinence, no dysuria, no paresthesias, no paresis and no tingling. Review of Systems   Constitutional: Negative for fever and malaise/fatigue. Genitourinary: Negative for bladder incontinence, dysuria, frequency and urgency. Musculoskeletal: Positive for back pain. Negative for joint pain, myalgias and neck pain. Neurological: Negative for tingling and paresthesias. Physical Exam  Constitutional:       Appearance: Normal appearance. HENT:      Head: Normocephalic and atraumatic. Cardiovascular:      Rate and Rhythm: Normal rate and regular rhythm. Pulses: Normal pulses. Heart sounds: Normal heart sounds. Pulmonary:      Breath sounds: Normal breath sounds. Abdominal:      Palpations: Abdomen is soft. Musculoskeletal:      Lumbar back: Tenderness present. No deformity, spasms or bony tenderness. Decreased range of motion. Negative right straight leg raise test and negative left straight leg raise test.        Back:       Comments: SLRs 90/90,DTRs normal and symmetrical     Neurological:      Mental Status: He is alert. ASSESSMENT and PLAN  Diagnoses and all orders for this visit:    1. Lumbar sprain, initial encounter,recommend heat light activities,call prn.rtw8/4/21  -     diclofenac EC (VOLTAREN) 75 mg EC tablet; Take 1 Tablet by mouth two (2) times daily (with meals). -     cyclobenzaprine (FLEXERIL) 10 mg tablet;  Take 1 Tablet by mouth three (3) times daily as needed for Muscle Spasm(s).

## 2021-07-28 NOTE — LETTER
NOTIFICATION OF RETURN TO WORK / SCHOOL    7/28/2021 12:27 PM    Mr. Anival Rader  [address] [facility] [address] [address]  . To Whom It May Concern:    Anival Rader was under the care of [facility] from 7/28/21. He will be able to return to work/school on 8/4/21 with no restrictions. If there are questions or concerns please have the patient contact our office.     Sincerely,      Mayte Aden MD

## 2022-03-21 PROCEDURE — 75810000275 HC EMERGENCY DEPT VISIT NO LEVEL OF CARE

## 2022-03-21 PROCEDURE — 75810000293 HC SIMP/SUPERF WND  RPR

## 2022-03-21 PROCEDURE — 90471 IMMUNIZATION ADMIN: CPT

## 2022-03-22 ENCOUNTER — APPOINTMENT (OUTPATIENT)
Dept: GENERAL RADIOLOGY | Age: 27
End: 2022-03-22
Attending: EMERGENCY MEDICINE

## 2022-03-22 ENCOUNTER — HOSPITAL ENCOUNTER (EMERGENCY)
Age: 27
Discharge: HOME | End: 2022-03-22
Attending: EMERGENCY MEDICINE

## 2022-03-22 VITALS
HEART RATE: 92 BPM | OXYGEN SATURATION: 100 % | HEIGHT: 77 IN | TEMPERATURE: 98.3 F | RESPIRATION RATE: 18 BRPM | BODY MASS INDEX: 30.3 KG/M2 | WEIGHT: 256.62 LBS | DIASTOLIC BLOOD PRESSURE: 90 MMHG | SYSTOLIC BLOOD PRESSURE: 160 MMHG

## 2022-03-22 DIAGNOSIS — S21.212A STAB WOUND OF LEFT SIDE OF BACK, INITIAL ENCOUNTER: Primary | ICD-10-CM

## 2022-03-22 PROCEDURE — 90715 TDAP VACCINE 7 YRS/> IM: CPT | Performed by: EMERGENCY MEDICINE

## 2022-03-22 PROCEDURE — 75810000293 HC SIMP/SUPERF WND  RPR

## 2022-03-22 PROCEDURE — 71046 X-RAY EXAM CHEST 2 VIEWS: CPT

## 2022-03-22 PROCEDURE — 74011250636 HC RX REV CODE- 250/636: Performed by: EMERGENCY MEDICINE

## 2022-03-22 PROCEDURE — 74011250637 HC RX REV CODE- 250/637: Performed by: EMERGENCY MEDICINE

## 2022-03-22 PROCEDURE — 99284 EMERGENCY DEPT VISIT MOD MDM: CPT

## 2022-03-22 PROCEDURE — 90471 IMMUNIZATION ADMIN: CPT

## 2022-03-22 RX ORDER — IBUPROFEN 400 MG/1
800 TABLET ORAL
Status: COMPLETED | OUTPATIENT
Start: 2022-03-22 | End: 2022-03-22

## 2022-03-22 RX ADMIN — IBUPROFEN 800 MG: 400 TABLET ORAL at 00:39

## 2022-03-22 RX ADMIN — TETANUS TOXOID, REDUCED DIPHTHERIA TOXOID AND ACELLULAR PERTUSSIS VACCINE, ADSORBED 0.5 ML: 5; 2.5; 8; 8; 2.5 SUSPENSION INTRAMUSCULAR at 00:39
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
## 2022-03-22 NOTE — DISCHARGE INSTRUCTIONS
Your x-ray tonight is reassuring. The wound was closed with tissue adhesive (skin glue) which will disintegrate on its own over the next week. We also placed Steri-Strips which are similar to Band-Aids, and will slowly peel off on their own over the next week or so. We also updated your tetanus vaccine today, which is good for 10 years. It was a pleasure taking care of you at [facility] Emergency Department today. We know that when you come to [facility], you are entrusting us with your health, comfort, and safety. Our physicians and nurses honor that trust, and we truly appreciate the opportunity to care for you and your loved ones. We also value your feedback. If you receive a survey about your Emergency Department experience today, please fill it out. We care about our patients' feedback, and we listen to what you have to say. Thank you!

## 2023-01-05 ENCOUNTER — OFFICE VISIT (OUTPATIENT)
Dept: FAMILY MEDICINE CLINIC | Age: 28
End: 2023-01-05
Payer: COMMERCIAL

## 2023-01-05 VITALS
OXYGEN SATURATION: 100 % | DIASTOLIC BLOOD PRESSURE: 83 MMHG | SYSTOLIC BLOOD PRESSURE: 144 MMHG | HEART RATE: 56 BPM | BODY MASS INDEX: 32.35 KG/M2 | WEIGHT: 274 LBS | HEIGHT: 77 IN

## 2023-01-05 DIAGNOSIS — F32.0 CURRENT MILD EPISODE OF MAJOR DEPRESSIVE DISORDER WITHOUT PRIOR EPISODE (HCC): ICD-10-CM

## 2023-01-05 DIAGNOSIS — Z00.00 ANNUAL PHYSICAL EXAM: Primary | ICD-10-CM

## 2023-01-05 DIAGNOSIS — R53.83 FATIGUE, UNSPECIFIED TYPE: ICD-10-CM

## 2023-01-05 LAB
BILIRUB UR QL STRIP: NEGATIVE
GLUCOSE UR-MCNC: NEGATIVE MG/DL
KETONES P FAST UR STRIP-MCNC: NEGATIVE MG/DL
PH UR STRIP: 5.5 [PH] (ref 4.6–8)
PROT UR QL STRIP: NEGATIVE
SP GR UR STRIP: 1.03 (ref 1–1.03)
UA UROBILINOGEN AMB POC: NORMAL (ref 0.2–1)
URINALYSIS CLARITY POC: NORMAL
URINALYSIS COLOR POC: YELLOW
URINE BLOOD POC: NORMAL
URINE LEUKOCYTES POC: NEGATIVE
URINE NITRITES POC: NEGATIVE

## 2023-01-05 RX ORDER — SERTRALINE HYDROCHLORIDE 25 MG/1
25 TABLET, FILM COATED ORAL DAILY
Qty: 30 TABLET | Refills: 2 | Status: SHIPPED | OUTPATIENT
Start: 2023-01-05

## 2023-01-05 NOTE — PROGRESS NOTES
Chief Complaint   Patient presents with    Physical     Patient would like to discuss his fluctuating weight. Occasional loss of appetite as well. Patient has been having problems sleeping at night and thinks may be developing anxiety, noticing signs of not wanting to be around people. 1. \"Have you been to the ER, urgent care clinic since your last visit? Hospitalized since your last visit? \" No    2. \"Have you seen or consulted any other health care providers outside of the [address] [place] [address] since your last visit? \" No     3. For patients aged 39-70: Has the patient had a colonoscopy / FIT/ Cologuard? NA - based on age      If the patient is female:    4. For patients aged 41-77: Has the patient had a mammogram within the past 2 years? NA - based on age or sex      11. For patients aged 21-65: Has the patient had a pap smear?  NA - based on age or sex

## 2023-01-05 NOTE — PROGRESS NOTES
Subjective:     Yvonne Mei is a 33 y.o. male presenting for annual exam and complete physical.    Patient Active Problem List   Diagnosis Code    Tobacco use disorder F17.200    Recurrent right knee instability M23.51    Chronic headaches R51.9, G89.29    Noncompliance Z91.199     Patient Active Problem List    Diagnosis Date Noted    Noncompliance 03/25/2015    Recurrent right knee instability 02/23/2015    Chronic headaches 02/23/2015    Tobacco use disorder 02/18/2015       No Known Allergies  Past Medical History:   Diagnosis Date    Asthma     history of asthma    Recurrent right knee instability 2/23/2015    Tobacco use disorder 2/18/2015     Past Surgical History:   Procedure Laterality Date    HX WRIST FRACTURE TX Left     2011     Family History   Problem Relation Age of Onset    Diabetes Sister     Elevated Lipids Sister     Diabetes Maternal Grandmother     Diabetes Maternal Grandfather     Hypertension Maternal Grandfather     No Known Problems Mother     No Known Problems Father      Social History     Tobacco Use    Smoking status: Some Days     Types: Cigarettes, Cigars    Smokeless tobacco: Never   Substance Use Topics    Alcohol use:  Yes     Alcohol/week: 0.0 standard drinks     Comment: social             Review of Systems  Constitutional: negative  Eyes: negative  Ears, nose, mouth, throat, and face: negative  Respiratory: negative  Cardiovascular: negative  Gastrointestinal: negative  Hematologic/lymphatic: negative  Musculoskeletal:negative  Neurological: negative  Behavioral/Psych: positive for depression    Objective:     Visit Vitals  BP (!) 144/83 (BP 1 Location: Right upper arm, BP Patient Position: Sitting, BP Cuff Size: Large adult)   Pulse (!) 56   Ht 6' 5\" (1.956 m)   Wt 274 lb (124.3 kg)   SpO2 100%   BMI 32.49 kg/m²     Physical exam:   General appearance - alert, well appearing, and in no distress, oriented to person, place, and time, and overweight  Mental status - alert, oriented to person, place, and time  Eyes - pupils equal and reactive, extraocular eye movements intact  Ears - bilateral TM's and external ear canals normal  Nose - normal and patent, no erythema, discharge or polyps  Mouth - mucous membranes moist, pharynx normal without lesions  Neck - supple, no significant adenopathy  Chest - clear to auscultation, no wheezes, rales or rhonchi, symmetric air entry  Heart - normal rate, regular rhythm, normal S1, S2, no murmurs, rubs, clicks or gallops  Abdomen - soft, nontender, nondistended, no masses or organomegaly  Neurological - alert, oriented, normal speech, no focal findings or movement disorder noted  Musculoskeletal - no joint tenderness, deformity or swelling  Extremities - peripheral pulses normal, no pedal edema, no clubbing or cyanosis  Skin - normal coloration and turgor, no rashes, no suspicious skin lesions noted   Psych- fatiguable,poor sleep,feeling down,no HI,SI  Assessment/Plan:       increase physical activity, routine labs ordered. Diagnoses and all orders for this visit:    1. Annual physical exam  -     METABOLIC PANEL, COMPREHENSIVE; Future  -     CHOLESTEROL, TOTAL; Future  -     CBC WITH AUTOMATED DIFF; Future  -     AMB POC URINALYSIS DIP STICK AUTO W/O MICRO    2. Fatigue, unspecified type  -     TSH 3RD GENERATION; Future  -     T4 (THYROXINE); Future    3. Current mild episode of major depressive disorder without prior episode (HCC),increase activity,improve sleep hygeine  -     sertraline (ZOLOFT) 25 mg tablet; Take 1 Tablet by mouth daily.   .  Rtc 4 weeks,call prn

## 2023-01-06 LAB
ALBUMIN SERPL-MCNC: 3.7 G/DL (ref 3.5–5)
ALBUMIN/GLOB SERPL: 1.1 {RATIO} (ref 1.1–2.2)
ALP SERPL-CCNC: 100 U/L (ref 45–117)
ALT SERPL-CCNC: 48 U/L (ref 12–78)
ANION GAP SERPL CALC-SCNC: 2 MMOL/L (ref 5–15)
AST SERPL-CCNC: 24 U/L (ref 15–37)
BASOPHILS # BLD: 0.1 K/UL (ref 0–0.1)
BASOPHILS NFR BLD: 1 % (ref 0–1)
BILIRUB SERPL-MCNC: 0.3 MG/DL (ref 0.2–1)
BUN SERPL-MCNC: 11 MG/DL (ref 6–20)
BUN/CREAT SERPL: 12 (ref 12–20)
CALCIUM SERPL-MCNC: 8.8 MG/DL (ref 8.5–10.1)
CHLORIDE SERPL-SCNC: 109 MMOL/L (ref 97–108)
CHOLEST SERPL-MCNC: 232 MG/DL
CO2 SERPL-SCNC: 25 MMOL/L (ref 21–32)
CREAT SERPL-MCNC: 0.95 MG/DL (ref 0.7–1.3)
DIFFERENTIAL METHOD BLD: ABNORMAL
EOSINOPHIL # BLD: 0.2 K/UL (ref 0–0.4)
EOSINOPHIL NFR BLD: 3 % (ref 0–7)
ERYTHROCYTE [DISTWIDTH] IN BLOOD BY AUTOMATED COUNT: 13.8 % (ref 11.5–14.5)
GLOBULIN SER CALC-MCNC: 3.3 G/DL (ref 2–4)
GLUCOSE SERPL-MCNC: 101 MG/DL (ref 65–100)
HCT VFR BLD AUTO: 48.6 % (ref 36.6–50.3)
HGB BLD-MCNC: 15.7 G/DL (ref 12.1–17)
IMM GRANULOCYTES # BLD AUTO: 0 K/UL (ref 0–0.04)
IMM GRANULOCYTES NFR BLD AUTO: 0 % (ref 0–0.5)
LYMPHOCYTES # BLD: 3.5 K/UL (ref 0.8–3.5)
LYMPHOCYTES NFR BLD: 44 % (ref 12–49)
MCH RBC QN AUTO: 27.1 PG (ref 26–34)
MCHC RBC AUTO-ENTMCNC: 32.3 G/DL (ref 30–36.5)
MCV RBC AUTO: 83.8 FL (ref 80–99)
MONOCYTES # BLD: 0.5 K/UL (ref 0–1)
MONOCYTES NFR BLD: 7 % (ref 5–13)
NEUTS SEG # BLD: 3.6 K/UL (ref 1.8–8)
NEUTS SEG NFR BLD: 45 % (ref 32–75)
NRBC # BLD: 0 K/UL (ref 0–0.01)
NRBC BLD-RTO: 0 PER 100 WBC
PLATELET # BLD AUTO: 243 K/UL (ref 150–400)
PMV BLD AUTO: 11.9 FL (ref 8.9–12.9)
POTASSIUM SERPL-SCNC: 4.1 MMOL/L (ref 3.5–5.1)
PROT SERPL-MCNC: 7 G/DL (ref 6.4–8.2)
RBC # BLD AUTO: 5.8 M/UL (ref 4.1–5.7)
SODIUM SERPL-SCNC: 136 MMOL/L (ref 136–145)
T4 SERPL-MCNC: 7.2 UG/DL (ref 4.5–12.1)
TSH SERPL DL<=0.05 MIU/L-ACNC: 2.03 UIU/ML (ref 0.36–3.74)
WBC # BLD AUTO: 7.9 K/UL (ref 4.1–11.1)

## 2023-05-23 RX ORDER — SERTRALINE HYDROCHLORIDE 25 MG/1
25 TABLET, FILM COATED ORAL DAILY
COMMUNITY
Start: 2023-01-05

## 2023-09-20 ENCOUNTER — OFFICE VISIT (OUTPATIENT)
Age: 28
End: 2023-09-20
Payer: COMMERCIAL

## 2023-09-20 VITALS
SYSTOLIC BLOOD PRESSURE: 132 MMHG | RESPIRATION RATE: 18 BRPM | OXYGEN SATURATION: 97 % | BODY MASS INDEX: 30.82 KG/M2 | TEMPERATURE: 98.9 F | WEIGHT: 261 LBS | DIASTOLIC BLOOD PRESSURE: 73 MMHG | HEIGHT: 77 IN | HEART RATE: 57 BPM

## 2023-09-20 DIAGNOSIS — N34.2 URETHRITIS: Primary | ICD-10-CM

## 2023-09-20 PROCEDURE — 99213 OFFICE O/P EST LOW 20 MIN: CPT | Performed by: FAMILY MEDICINE

## 2023-09-20 RX ORDER — MOXIFLOXACIN HYDROCHLORIDE 400 MG/1
400 TABLET ORAL DAILY
Qty: 7 TABLET | Refills: 0 | Status: SHIPPED | OUTPATIENT
Start: 2023-09-20 | End: 2023-09-27

## 2023-09-20 SDOH — ECONOMIC STABILITY: FOOD INSECURITY: WITHIN THE PAST 12 MONTHS, YOU WORRIED THAT YOUR FOOD WOULD RUN OUT BEFORE YOU GOT MONEY TO BUY MORE.: NEVER TRUE

## 2023-09-20 SDOH — ECONOMIC STABILITY: HOUSING INSECURITY
IN THE LAST 12 MONTHS, WAS THERE A TIME WHEN YOU DID NOT HAVE A STEADY PLACE TO SLEEP OR SLEPT IN A SHELTER (INCLUDING NOW)?: NO

## 2023-09-20 SDOH — ECONOMIC STABILITY: INCOME INSECURITY: HOW HARD IS IT FOR YOU TO PAY FOR THE VERY BASICS LIKE FOOD, HOUSING, MEDICAL CARE, AND HEATING?: NOT HARD AT ALL

## 2023-09-20 SDOH — ECONOMIC STABILITY: FOOD INSECURITY: WITHIN THE PAST 12 MONTHS, THE FOOD YOU BOUGHT JUST DIDN'T LAST AND YOU DIDN'T HAVE MONEY TO GET MORE.: NEVER TRUE

## 2023-09-20 ASSESSMENT — PATIENT HEALTH QUESTIONNAIRE - PHQ9
SUM OF ALL RESPONSES TO PHQ QUESTIONS 1-9: 0
1. LITTLE INTEREST OR PLEASURE IN DOING THINGS: 0
2. FEELING DOWN, DEPRESSED OR HOPELESS: 0
SUM OF ALL RESPONSES TO PHQ QUESTIONS 1-9: 0
SUM OF ALL RESPONSES TO PHQ QUESTIONS 1-9: 0
SUM OF ALL RESPONSES TO PHQ9 QUESTIONS 1 & 2: 0
SUM OF ALL RESPONSES TO PHQ QUESTIONS 1-9: 0

## 2023-09-20 ASSESSMENT — ENCOUNTER SYMPTOMS
SORE THROAT: 0
ABDOMINAL PAIN: 0

## 2023-09-20 NOTE — PROGRESS NOTES
Chief Complaint   Patient presents with    Follow-up     Patient is here today for a 7 month follow up and is requesting some testing. 1. Have you been to the ER, urgent care clinic since your last visit? Hospitalized since your last visit? No    2. Have you seen or consulted any other health care providers outside of the [address] [place] [address] since your last visit? Include any pap smears or colon screening.  No

## 2023-09-20 NOTE — PROGRESS NOTES
Subjective:      Patient ID: Michi Carrington is a 33 y.o. male. Partner tested positive for mycoplasma G. Pt is asymptoomatic    Abnormal Lab  This is a new problem. The current episode started yesterday. Pertinent negatives include no abdominal pain, chills, congestion, fatigue, rash, sore throat or urinary symptoms. Review of Systems   Constitutional:  Negative for chills and fatigue. HENT:  Negative for congestion and sore throat. Gastrointestinal:  Negative for abdominal pain. Genitourinary:  Negative for difficulty urinating, dysuria, hematuria and penile discharge. Skin:  Negative for rash. Objective:   Physical Exam  Constitutional:       Appearance: Normal appearance. He is normal weight. HENT:      Head: Normocephalic and atraumatic. Right Ear: Tympanic membrane normal.      Left Ear: Tympanic membrane normal.      Nose: Nose normal.      Mouth/Throat:      Mouth: Mucous membranes are moist.   Eyes:      Pupils: Pupils are equal, round, and reactive to light. Cardiovascular:      Rate and Rhythm: Normal rate and regular rhythm. Pulses: Normal pulses. Heart sounds: Normal heart sounds. Abdominal:      Palpations: Abdomen is soft. Skin:     General: Skin is warm and dry. Neurological:      Mental Status: He is alert. Gopi was seen today for follow-up. Diagnoses and all orders for this visit:    Garima Roberts performed ,will treat while awaiting results  -     Genital Mycoplasmas KYLE, Urine; Future  -     moxifloxacin (AVELOX) 400 MG tablet;  Take 1 tablet by mouth daily for 7 days  -     Genital Mycoplasmas KYLE, Urine          Liang Servin MD

## 2023-09-24 LAB
M GENITALIUM DNA SPEC QL NAA+PROBE: POSITIVE
M HOMINIS DNA SPEC QL NAA+PROBE: POSITIVE
UREAPLASMA DNA SPEC QL NAA+PROBE: POSITIVE

## 2023-11-15 ENCOUNTER — OFFICE VISIT (OUTPATIENT)
Age: 28
End: 2023-11-15
Payer: COMMERCIAL

## 2023-11-15 VITALS
SYSTOLIC BLOOD PRESSURE: 132 MMHG | BODY MASS INDEX: 31.36 KG/M2 | WEIGHT: 265.6 LBS | OXYGEN SATURATION: 96 % | TEMPERATURE: 98.9 F | HEART RATE: 90 BPM | HEIGHT: 77 IN | DIASTOLIC BLOOD PRESSURE: 84 MMHG | RESPIRATION RATE: 18 BRPM

## 2023-11-15 DIAGNOSIS — H92.03 OTALGIA OF BOTH EARS: Primary | ICD-10-CM

## 2023-11-15 DIAGNOSIS — J06.9 VIRAL URI: ICD-10-CM

## 2023-11-15 PROCEDURE — 99213 OFFICE O/P EST LOW 20 MIN: CPT | Performed by: FAMILY MEDICINE

## 2023-11-15 RX ORDER — AMOXICILLIN 500 MG/1
500 CAPSULE ORAL 3 TIMES DAILY
Qty: 21 CAPSULE | Refills: 0 | Status: SHIPPED | OUTPATIENT
Start: 2023-11-15 | End: 2023-11-22

## 2023-11-15 RX ORDER — GUAIFENESIN AND DEXTROMETHORPHAN HYDROBROMIDE 600; 30 MG/1; MG/1
1 TABLET, EXTENDED RELEASE ORAL 2 TIMES DAILY PRN
Qty: 20 TABLET | Refills: 2 | Status: SHIPPED | OUTPATIENT
Start: 2023-11-15

## 2023-11-15 ASSESSMENT — PATIENT HEALTH QUESTIONNAIRE - PHQ9
1. LITTLE INTEREST OR PLEASURE IN DOING THINGS: 1
SUM OF ALL RESPONSES TO PHQ9 QUESTIONS 1 & 2: 2
2. FEELING DOWN, DEPRESSED OR HOPELESS: 1
SUM OF ALL RESPONSES TO PHQ QUESTIONS 1-9: 2

## 2023-11-15 ASSESSMENT — ENCOUNTER SYMPTOMS
SORE THROAT: 0
CHEST TIGHTNESS: 0
WHEEZING: 0
STRIDOR: 0

## 2023-11-15 NOTE — PROGRESS NOTES
Subjective:      Patient ID: Rita Taylor is a 33 y.o. male. bilateral otalgia and hearing loss x 4 days. Otalgia   There is pain in both ears. This is a new problem. The current episode started in the past 7 days. The problem occurs hourly. The problem has been unchanged. There has been no fever. The pain is moderate. Associated symptoms include headaches and neck pain. Pertinent negatives include no rash or sore throat. Review of Systems   HENT:  Positive for congestion and ear pain. Negative for sore throat. Respiratory:  Negative for chest tightness, wheezing and stridor. Cardiovascular:  Negative for chest pain. Musculoskeletal:  Positive for neck pain. Skin:  Negative for rash. Neurological:  Positive for headaches. Objective:   Physical Exam  Constitutional:       Appearance: Normal appearance. He is obese. HENT:      Ears:      Comments: Cloudy distorte bilateral TMs     Nose: Congestion present. Mouth/Throat:      Pharynx: Posterior oropharyngeal erythema present. Eyes:      Pupils: Pupils are equal, round, and reactive to light. Musculoskeletal:      Cervical back: Tenderness present. No rigidity. Lymphadenopathy:      Cervical: No cervical adenopathy. Neurological:      Mental Status: He is alert. Gopi was seen today for otalgia. Diagnoses and all orders for this visit:    Otalgia of both ears  -     amoxicillin (AMOXIL) 500 MG capsule;  Take 1 capsule by mouth 3 times daily for 7 days  -     Dextromethorphan-guaiFENesin (MUCINEX DM)  MG TB12; Take 1 tablet by mouth 2 times daily as needed (congestion)    Viral URI      Tyleol for pain,increase hydration    Joycelyn Gibbs MD

## 2023-11-15 NOTE — PROGRESS NOTES
Chief Complaint   Patient presents with    Otalgia     Patient is here today for severe ear pain. 1. Have you been to the ER, urgent care clinic since your last visit? Hospitalized since your last visit? Urgent Care for ear pain 11/14/23    2. Have you seen or consulted any other health care providers outside of the [address] [place] [address] since your last visit? Include any pap smears or colon screening.  No

## 2023-11-17 ENCOUNTER — TELEPHONE (OUTPATIENT)
Age: 28
End: 2023-11-17

## 2023-11-17 NOTE — TELEPHONE ENCOUNTER
[phone] Patient mom - Sue Leyva stepped by to get an updated letter for his job, he was told by Dr. Chen Armstrong to stop by and he would give him another note if he needed to stay out longer.

## 2024-03-13 ENCOUNTER — TELEPHONE (OUTPATIENT)
Age: 29
End: 2024-03-13

## 2024-03-13 NOTE — TELEPHONE ENCOUNTER
Patient mother dropped off FMLA form for her son.  $35.00 was paid..  She will  form.    Form was placed in provider's box

## 2024-03-21 ENCOUNTER — TELEPHONE (OUTPATIENT)
Age: 29
End: 2024-03-21

## 2024-03-21 ENCOUNTER — OFFICE VISIT (OUTPATIENT)
Age: 29
End: 2024-03-21

## 2024-03-21 DIAGNOSIS — G44.229 CHRONIC TENSION-TYPE HEADACHE, NOT INTRACTABLE: Primary | ICD-10-CM

## 2024-03-21 NOTE — TELEPHONE ENCOUNTER
----- Message from Gopi Flores sent at 3/21/2024  1:42 PM EDT -----  Regarding: Appointment for [facility]  Contact: [phone]  Dr. Sullivan I had a scheduled appointment today the 21st and for some reason when someone calls my phone my alarm cutoff.  I was coming in so you can fill out my FMLA paperwork that my mother dropped off last week.  I came in there late but you all was gone for lunch and I need my paperwork done by a certain date.  Your appointments are booked all the way can you give me a call at [phone] as soon as possible to see if you can fit me in if I can’t be reach please reach out to my mother Yenny Canales at [phone]    Thank you

## 2024-03-26 ENCOUNTER — OFFICE VISIT (OUTPATIENT)
Age: 29
End: 2024-03-26
Payer: COMMERCIAL

## 2024-03-26 VITALS
HEART RATE: 80 BPM | SYSTOLIC BLOOD PRESSURE: 130 MMHG | OXYGEN SATURATION: 97 % | BODY MASS INDEX: 32.52 KG/M2 | TEMPERATURE: 98.2 F | DIASTOLIC BLOOD PRESSURE: 78 MMHG | HEIGHT: 77 IN | WEIGHT: 275.4 LBS | RESPIRATION RATE: 18 BRPM

## 2024-03-26 DIAGNOSIS — M54.50 BILATERAL LOW BACK PAIN WITHOUT SCIATICA, UNSPECIFIED CHRONICITY: ICD-10-CM

## 2024-03-26 DIAGNOSIS — Z87.828 S/P ARTHROSCOPIC PARTIAL MEDIAL MENISCECTOMY OF RIGHT KNEE: ICD-10-CM

## 2024-03-26 DIAGNOSIS — G89.29 CHRONIC PAIN OF RIGHT KNEE: Primary | ICD-10-CM

## 2024-03-26 DIAGNOSIS — Z98.890 S/P ARTHROSCOPIC PARTIAL MEDIAL MENISCECTOMY OF RIGHT KNEE: ICD-10-CM

## 2024-03-26 DIAGNOSIS — M25.561 CHRONIC PAIN OF RIGHT KNEE: Primary | ICD-10-CM

## 2024-03-26 PROCEDURE — 99213 OFFICE O/P EST LOW 20 MIN: CPT | Performed by: FAMILY MEDICINE

## 2024-03-26 RX ORDER — DICLOFENAC SODIUM 75 MG/1
75 TABLET, DELAYED RELEASE ORAL 2 TIMES DAILY
Qty: 30 TABLET | Refills: 3 | Status: SHIPPED | OUTPATIENT
Start: 2024-03-26

## 2024-03-26 ASSESSMENT — PATIENT HEALTH QUESTIONNAIRE - PHQ9
SUM OF ALL RESPONSES TO PHQ9 QUESTIONS 1 & 2: 2
7. TROUBLE CONCENTRATING ON THINGS, SUCH AS READING THE NEWSPAPER OR WATCHING TELEVISION: NOT AT ALL
SUM OF ALL RESPONSES TO PHQ QUESTIONS 1-9: 2
10. IF YOU CHECKED OFF ANY PROBLEMS, HOW DIFFICULT HAVE THESE PROBLEMS MADE IT FOR YOU TO DO YOUR WORK, TAKE CARE OF THINGS AT HOME, OR GET ALONG WITH OTHER PEOPLE: NOT DIFFICULT AT ALL
5. POOR APPETITE OR OVEREATING: NOT AT ALL
SUM OF ALL RESPONSES TO PHQ QUESTIONS 1-9: 2
6. FEELING BAD ABOUT YOURSELF - OR THAT YOU ARE A FAILURE OR HAVE LET YOURSELF OR YOUR FAMILY DOWN: NOT AT ALL
SUM OF ALL RESPONSES TO PHQ QUESTIONS 1-9: 2
3. TROUBLE FALLING OR STAYING ASLEEP: NOT AT ALL
1. LITTLE INTEREST OR PLEASURE IN DOING THINGS: SEVERAL DAYS
9. THOUGHTS THAT YOU WOULD BE BETTER OFF DEAD, OR OF HURTING YOURSELF: NOT AT ALL
2. FEELING DOWN, DEPRESSED OR HOPELESS: SEVERAL DAYS
4. FEELING TIRED OR HAVING LITTLE ENERGY: NOT AT ALL
SUM OF ALL RESPONSES TO PHQ QUESTIONS 1-9: 2
8. MOVING OR SPEAKING SO SLOWLY THAT OTHER PEOPLE COULD HAVE NOTICED. OR THE OPPOSITE, BEING SO FIGETY OR RESTLESS THAT YOU HAVE BEEN MOVING AROUND A LOT MORE THAN USUAL: NOT AT ALL

## 2024-03-26 ASSESSMENT — ENCOUNTER SYMPTOMS: BACK PAIN: 1

## 2024-03-26 NOTE — PROGRESS NOTES
Chief Complaint   Patient presents with    Follow-up     Patient is here today for FMLA forms.      1. Have you been to the ER, urgent care clinic since your last visit?  Hospitalized since your last visit?No    2. Have you seen or consulted any other health care providers outside of the [facility] since your last visit?  Include any pap smears or colon screening. No    
medial meniscectomy of right knee    Bilateral low back pain without sciatica, unspecified chronicity  -     diclofenac (VOLTAREN) 75 MG EC tablet; Take 1 tablet by mouth 2 times daily      Continue current meds and treatments,and call if you have any questions.          Giles Sullivan MD

## 2024-08-27 ENCOUNTER — TELEPHONE (OUTPATIENT)
Age: 29
End: 2024-08-27

## 2024-08-27 NOTE — TELEPHONE ENCOUNTER
Wes Rodriguez I missed your call yesterday but I am definitely interested in loosing weight you are booked all the way into December and I just want to start taking the shots.  If you can squeeze me in I will appreciate it .  If I cannot be reached I give you permission to talk to my mother Yenny Canales at [phone]  ---------------------------------------------------------------  Pt was sent a message through my chart to call and make an appointment or he could make an appointment online through my chart.  Patient number is [phone]  Patient last seen 3/26/24

## 2024-10-30 ENCOUNTER — TELEPHONE (OUTPATIENT)
Age: 29
End: 2024-10-30

## 2024-10-30 NOTE — TELEPHONE ENCOUNTER
Called the pt to let know his forms were ready for , no answer.  I called his mother Yenny Canales, listed on this Communication Release of Information form and she stated she will pick the forms up tomorrow morning.

## 2025-02-17 NOTE — PROGRESS NOTES
NAME:  Gopi Flores   :   1995   MRN:   [id number]     Date/Time:  2025 7:24 PM  Subjective: worsening bilaateral lumbago,recurrent.No apparent injury.Stable chronic rt knee discomfort   HPI   Back Pain  This is a recurrent problem. The current episode started more than 1 year ago. The problem occurs intermittently. The problem has been gradually improving since onset. The pain is present in the lumbar spine. The pain does not radiate.   Knee Pain   The incident occurred more than 1 week ago.  The pain is present in the right knee. The quality of the pain is described as aching. The pain is at a severity of 6/10. The pain is moderate. The pain has been Improving since onset.   Review of Systems   Constitutional:  Positive for fatigue.   HENT:  Negative for congestion.    Musculoskeletal:  Positive for arthralgias, back pain, gait problem and joint swelling.             Medications reviewed:  Current Outpatient Medications   Medication Sig    diclofenac (VOLTAREN) 75 MG EC tablet Take 1 tablet by mouth 2 times daily    Dextromethorphan-guaiFENesin (MUCINEX DM)  MG TB12 Take 1 tablet by mouth 2 times daily as needed (congestion)    sertraline (ZOLOFT) 25 MG tablet Take 1 tablet by mouth daily (Patient not taking: Reported on 11/15/2023)     No current facility-administered medications for this visit.        Objective:   Vitals:  There were no vitals taken for this visit.       PHYSICAL EXAM:  General:     Alert, cooperative, no distress, appears stated age.     Head:    Normocephalic, without obvious abnormality, atraumatic.  Ears:   External canals WNL TMs WNL   Eyes:    Conjunctivae/corneas clear.  PERRLA  Nose:   Nares normal. No drainage or sinus tenderness.  Throat:     Lips, mucosa, and tongue normal.  No Thrush  Neck:   Supple, symmetrical,  no adenopathy, thyroid: non tender     no carotid bruit and no JVD.  Back:     Tender lumbar paraspinals,limited ROM,.SLRs 90/90,DTRs normal and

## 2025-02-19 ENCOUNTER — OFFICE VISIT (OUTPATIENT)
Age: 30
End: 2025-02-19
Payer: COMMERCIAL

## 2025-02-19 VITALS
HEART RATE: 55 BPM | OXYGEN SATURATION: 99 % | SYSTOLIC BLOOD PRESSURE: 116 MMHG | WEIGHT: 226.2 LBS | DIASTOLIC BLOOD PRESSURE: 81 MMHG | RESPIRATION RATE: 18 BRPM | HEIGHT: 77 IN | TEMPERATURE: 98.1 F | BODY MASS INDEX: 26.71 KG/M2

## 2025-02-19 DIAGNOSIS — Z98.890 S/P ARTHROSCOPIC PARTIAL MEDIAL MENISCECTOMY OF RIGHT KNEE: ICD-10-CM

## 2025-02-19 DIAGNOSIS — M25.561 CHRONIC PAIN OF RIGHT KNEE: ICD-10-CM

## 2025-02-19 DIAGNOSIS — G89.29 CHRONIC PAIN OF RIGHT KNEE: ICD-10-CM

## 2025-02-19 DIAGNOSIS — M54.50 BILATERAL LOW BACK PAIN WITHOUT SCIATICA, UNSPECIFIED CHRONICITY: Primary | ICD-10-CM

## 2025-02-19 DIAGNOSIS — Z87.828 S/P ARTHROSCOPIC PARTIAL MEDIAL MENISCECTOMY OF RIGHT KNEE: ICD-10-CM

## 2025-02-19 PROCEDURE — 99213 OFFICE O/P EST LOW 20 MIN: CPT | Performed by: FAMILY MEDICINE

## 2025-02-19 RX ORDER — PREDNISONE 10 MG/1
10 TABLET ORAL 2 TIMES DAILY
Qty: 14 TABLET | Refills: 0 | Status: SHIPPED | OUTPATIENT
Start: 2025-02-19 | End: 2025-02-26

## 2025-02-19 RX ORDER — TIRZEPATIDE 10 MG/.5ML
INJECTION, SOLUTION SUBCUTANEOUS
COMMUNITY
Start: 2025-01-07 | End: 2025-02-19 | Stop reason: ALTCHOICE

## 2025-02-19 RX ORDER — METHOCARBAMOL 500 MG/1
500 TABLET, FILM COATED ORAL 4 TIMES DAILY PRN
Qty: 40 TABLET | Refills: 1 | Status: SHIPPED | OUTPATIENT
Start: 2025-02-19 | End: 2025-03-11

## 2025-02-19 SDOH — ECONOMIC STABILITY: FOOD INSECURITY: WITHIN THE PAST 12 MONTHS, YOU WORRIED THAT YOUR FOOD WOULD RUN OUT BEFORE YOU GOT MONEY TO BUY MORE.: NEVER TRUE

## 2025-02-19 SDOH — ECONOMIC STABILITY: FOOD INSECURITY: WITHIN THE PAST 12 MONTHS, THE FOOD YOU BOUGHT JUST DIDN'T LAST AND YOU DIDN'T HAVE MONEY TO GET MORE.: NEVER TRUE

## 2025-02-19 ASSESSMENT — PATIENT HEALTH QUESTIONNAIRE - PHQ9
1. LITTLE INTEREST OR PLEASURE IN DOING THINGS: SEVERAL DAYS
SUM OF ALL RESPONSES TO PHQ9 QUESTIONS 1 & 2: 2
2. FEELING DOWN, DEPRESSED OR HOPELESS: SEVERAL DAYS
SUM OF ALL RESPONSES TO PHQ QUESTIONS 1-9: 2

## 2025-02-19 ASSESSMENT — ENCOUNTER SYMPTOMS: BACK PAIN: 1

## 2025-02-19 NOTE — PROGRESS NOTES
Chief Complaint   Patient presents with    Back Pain     Patient is here today for back pain and knee pain.      \"Have you been to the ER, urgent care clinic since your last visit?  Hospitalized since your last visit?\"    11/6/24 [facility] for vomiting and body aches    “Have you seen or consulted any other health care providers outside of [facility] since your last visit?”    NO            Click Here for Release of Records Request
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
## 2025-03-21 ENCOUNTER — OFFICE VISIT (OUTPATIENT)
Age: 30
End: 2025-03-21

## 2025-03-21 VITALS
TEMPERATURE: 98.4 F | DIASTOLIC BLOOD PRESSURE: 84 MMHG | SYSTOLIC BLOOD PRESSURE: 126 MMHG | BODY MASS INDEX: 27.61 KG/M2 | WEIGHT: 233.8 LBS | HEART RATE: 71 BPM | OXYGEN SATURATION: 97 % | RESPIRATION RATE: 18 BRPM | HEIGHT: 77 IN

## 2025-03-21 DIAGNOSIS — Z87.828 S/P ARTHROSCOPIC PARTIAL MEDIAL MENISCECTOMY OF RIGHT KNEE: ICD-10-CM

## 2025-03-21 DIAGNOSIS — Z98.890 S/P ARTHROSCOPIC PARTIAL MEDIAL MENISCECTOMY OF RIGHT KNEE: ICD-10-CM

## 2025-03-21 DIAGNOSIS — Z11.59 ENCOUNTER FOR HEPATITIS C SCREENING TEST FOR LOW RISK PATIENT: ICD-10-CM

## 2025-03-21 DIAGNOSIS — Z11.4 SCREENING FOR HIV (HUMAN IMMUNODEFICIENCY VIRUS): ICD-10-CM

## 2025-03-21 DIAGNOSIS — Z00.00 WELLNESS EXAMINATION: Primary | ICD-10-CM

## 2025-03-21 LAB
ALBUMIN SERPL-MCNC: 4 G/DL (ref 3.5–5)
ALBUMIN/GLOB SERPL: 1.2 (ref 1.1–2.2)
ALP SERPL-CCNC: 110 U/L (ref 45–117)
ALT SERPL-CCNC: 35 U/L (ref 12–78)
ANION GAP SERPL CALC-SCNC: 3 MMOL/L (ref 2–12)
AST SERPL-CCNC: 20 U/L (ref 15–37)
BASOPHILS # BLD: 0.06 K/UL (ref 0–0.1)
BASOPHILS NFR BLD: 0.6 % (ref 0–1)
BILIRUB SERPL-MCNC: 0.3 MG/DL (ref 0.2–1)
BILIRUBIN, URINE, POC: NEGATIVE
BLOOD URINE, POC: NORMAL
BUN SERPL-MCNC: 14 MG/DL (ref 6–20)
BUN/CREAT SERPL: 16 (ref 12–20)
CALCIUM SERPL-MCNC: 9.6 MG/DL (ref 8.5–10.1)
CHLORIDE SERPL-SCNC: 105 MMOL/L (ref 97–108)
CHOLEST SERPL-MCNC: 199 MG/DL
CO2 SERPL-SCNC: 29 MMOL/L (ref 21–32)
CREAT SERPL-MCNC: 0.88 MG/DL (ref 0.7–1.3)
DIFFERENTIAL METHOD BLD: ABNORMAL
EOSINOPHIL # BLD: 0.33 K/UL (ref 0–0.4)
EOSINOPHIL NFR BLD: 3.2 % (ref 0–7)
ERYTHROCYTE [DISTWIDTH] IN BLOOD BY AUTOMATED COUNT: 14 % (ref 11.5–14.5)
GLOBULIN SER CALC-MCNC: 3.4 G/DL (ref 2–4)
GLUCOSE SERPL-MCNC: 91 MG/DL (ref 65–100)
GLUCOSE URINE, POC: NEGATIVE
HCT VFR BLD AUTO: 45.7 % (ref 36.6–50.3)
HCV AB SER IA-ACNC: 0.14 INDEX
HCV AB SERPL QL IA: NONREACTIVE
HDLC SERPL-MCNC: 41 MG/DL
HDLC SERPL: 4.9 (ref 0–5)
HGB BLD-MCNC: 14.9 G/DL (ref 12.1–17)
HIV 1+2 AB+HIV1 P24 AG SERPL QL IA: NONREACTIVE
HIV 1/2 RESULT COMMENT: NORMAL
IMM GRANULOCYTES # BLD AUTO: 0.03 K/UL (ref 0–0.04)
IMM GRANULOCYTES NFR BLD AUTO: 0.3 % (ref 0–0.5)
KETONES, URINE, POC: NEGATIVE
LDLC SERPL CALC-MCNC: 146 MG/DL (ref 0–100)
LEUKOCYTE ESTERASE, URINE, POC: NEGATIVE
LYMPHOCYTES # BLD: 3.87 K/UL (ref 0.8–3.5)
LYMPHOCYTES NFR BLD: 38 % (ref 12–49)
MCH RBC QN AUTO: 27.5 PG (ref 26–34)
MCHC RBC AUTO-ENTMCNC: 32.6 G/DL (ref 30–36.5)
MCV RBC AUTO: 84.5 FL (ref 80–99)
MONOCYTES # BLD: 0.57 K/UL (ref 0–1)
MONOCYTES NFR BLD: 5.6 % (ref 5–13)
NEUTS SEG # BLD: 5.32 K/UL (ref 1.8–8)
NEUTS SEG NFR BLD: 52.3 % (ref 32–75)
NITRITE, URINE, POC: NEGATIVE
NRBC # BLD: 0 K/UL (ref 0–0.01)
NRBC BLD-RTO: 0 PER 100 WBC
PH, URINE, POC: 5.5 (ref 4.6–8)
PLATELET # BLD AUTO: 274 K/UL (ref 150–400)
PMV BLD AUTO: 11.6 FL (ref 8.9–12.9)
POTASSIUM SERPL-SCNC: 4.6 MMOL/L (ref 3.5–5.1)
PROT SERPL-MCNC: 7.4 G/DL (ref 6.4–8.2)
PROTEIN,URINE, POC: NEGATIVE
RBC # BLD AUTO: 5.41 M/UL (ref 4.1–5.7)
SODIUM SERPL-SCNC: 137 MMOL/L (ref 136–145)
SPECIFIC GRAVITY, URINE, POC: 1.02 (ref 1–1.03)
TRIGL SERPL-MCNC: 60 MG/DL
URINALYSIS CLARITY, POC: CLEAR
URINALYSIS COLOR, POC: YELLOW
UROBILINOGEN, POC: NORMAL MG/DL
VLDLC SERPL CALC-MCNC: 12 MG/DL
WBC # BLD AUTO: 10.2 K/UL (ref 4.1–11.1)

## 2025-03-21 RX ORDER — PREDNISONE 10 MG/1
10 TABLET ORAL 2 TIMES DAILY
COMMUNITY
Start: 2025-03-09 | End: 2025-03-16

## 2025-03-21 ASSESSMENT — PATIENT HEALTH QUESTIONNAIRE - PHQ9
SUM OF ALL RESPONSES TO PHQ QUESTIONS 1-9: 2
SUM OF ALL RESPONSES TO PHQ QUESTIONS 1-9: 2
1. LITTLE INTEREST OR PLEASURE IN DOING THINGS: SEVERAL DAYS
SUM OF ALL RESPONSES TO PHQ QUESTIONS 1-9: 2
2. FEELING DOWN, DEPRESSED OR HOPELESS: SEVERAL DAYS
SUM OF ALL RESPONSES TO PHQ QUESTIONS 1-9: 2

## 2025-03-21 ASSESSMENT — ENCOUNTER SYMPTOMS
ANAL BLEEDING: 0
CHEST TIGHTNESS: 0
BACK PAIN: 1
SHORTNESS OF BREATH: 0

## 2025-03-21 NOTE — PROGRESS NOTES
Chief Complaint   Patient presents with    Annual Exam     Patient is here today for his annual exam.      \"Have you been to the ER, urgent care clinic since your last visit?  Hospitalized since your last visit?\"    NO    “Have you seen or consulted any other health care providers outside of [facility] since your last visit?”    NO            Click Here for Release of Records Request

## 2025-03-24 ENCOUNTER — RESULTS FOLLOW-UP (OUTPATIENT)
Age: 30
End: 2025-03-24

## 2025-04-15 ENCOUNTER — TELEPHONE (OUTPATIENT)
Age: 30
End: 2025-04-15

## 2025-04-15 NOTE — TELEPHONE ENCOUNTER
Gopi Flores to [facility] Clinical Staff (supporting Giles Sullivan MD) (Selected Message)      4/15/25  9:57 AM  Good Morning just following up to see when will my FMLA paperwork be ready for pickup that I dropped off past week.  Left a note to notify my mother Yenny Canales [phone] when ready.      Thank you  Gopi Folres  [phone]  -------------------------------------------------------------------  Per the pt he need the FMLA by 4/22/25.  Pt made aware it can take 7-14 days to get forms completed and his mother will be contacted as soon as they are ready.

## 2025-04-16 ENCOUNTER — CLINICAL DOCUMENTATION (OUTPATIENT)
Age: 30
End: 2025-04-16

## 2025-04-16 NOTE — PROGRESS NOTES
Pt FMLA forms were faxed today to [employer] at [phone].  Pt mother Yenny Canales was called to  the forms from our .

## 2025-05-14 ENCOUNTER — APPOINTMENT (OUTPATIENT)
Facility: HOSPITAL | Age: 30
End: 2025-05-14
Payer: COMMERCIAL

## 2025-05-14 ENCOUNTER — HOSPITAL ENCOUNTER (OUTPATIENT)
Facility: HOSPITAL | Age: 30
Setting detail: OBSERVATION
Discharge: LEFT BEFORE DISCHARGE | End: 2025-05-14
Attending: EMERGENCY MEDICINE | Admitting: FAMILY MEDICINE
Payer: COMMERCIAL

## 2025-05-14 VITALS
DIASTOLIC BLOOD PRESSURE: 80 MMHG | OXYGEN SATURATION: 98 % | BODY MASS INDEX: 27.06 KG/M2 | WEIGHT: 222.22 LBS | HEIGHT: 76 IN | TEMPERATURE: 98.5 F | HEART RATE: 68 BPM | RESPIRATION RATE: 13 BRPM | SYSTOLIC BLOOD PRESSURE: 104 MMHG

## 2025-05-14 DIAGNOSIS — E80.6 HYPERBILIRUBINEMIA: Primary | ICD-10-CM

## 2025-05-14 PROBLEM — D72.829 LEUKOCYTOSIS: Status: ACTIVE | Noted: 2025-05-14

## 2025-05-14 LAB
ALBUMIN SERPL-MCNC: 4.8 G/DL (ref 3.5–5)
ALBUMIN/GLOB SERPL: 1.1 (ref 1.1–2.2)
ALP SERPL-CCNC: 129 U/L (ref 45–117)
ALT SERPL-CCNC: 33 U/L (ref 12–78)
ANION GAP SERPL CALC-SCNC: 10 MMOL/L (ref 2–12)
AST SERPL-CCNC: 26 U/L (ref 15–37)
BASOPHILS # BLD: 0.04 K/UL (ref 0–0.1)
BASOPHILS NFR BLD: 0.3 % (ref 0–1)
BILIRUB SERPL-MCNC: 2.6 MG/DL (ref 0.2–1)
BUN SERPL-MCNC: 17 MG/DL (ref 6–20)
BUN/CREAT SERPL: 12 (ref 12–20)
CALCIUM SERPL-MCNC: 10.9 MG/DL (ref 8.5–10.1)
CHLORIDE SERPL-SCNC: 99 MMOL/L (ref 97–108)
CO2 SERPL-SCNC: 25 MMOL/L (ref 21–32)
COMMENT:: NORMAL
CREAT SERPL-MCNC: 1.42 MG/DL (ref 0.7–1.3)
DIFFERENTIAL METHOD BLD: ABNORMAL
EKG ATRIAL RATE: 89 BPM
EKG DIAGNOSIS: NORMAL
EKG P AXIS: 73 DEGREES
EKG P-R INTERVAL: 146 MS
EKG Q-T INTERVAL: 360 MS
EKG QRS DURATION: 90 MS
EKG QTC CALCULATION (BAZETT): 438 MS
EKG R AXIS: 39 DEGREES
EKG T AXIS: 66 DEGREES
EKG VENTRICULAR RATE: 89 BPM
EOSINOPHIL # BLD: 0.07 K/UL (ref 0–0.4)
EOSINOPHIL NFR BLD: 0.4 % (ref 0–7)
ERYTHROCYTE [DISTWIDTH] IN BLOOD BY AUTOMATED COUNT: 15 % (ref 11.5–14.5)
GLOBULIN SER CALC-MCNC: 4.5 G/DL (ref 2–4)
GLUCOSE SERPL-MCNC: 92 MG/DL (ref 65–100)
HCT VFR BLD AUTO: 53.5 % (ref 36.6–50.3)
HGB BLD-MCNC: 18.3 G/DL (ref 12.1–17)
IMM GRANULOCYTES # BLD AUTO: 0.08 K/UL (ref 0–0.04)
IMM GRANULOCYTES NFR BLD AUTO: 0.5 % (ref 0–0.5)
LACTATE SERPL-SCNC: 1.1 MMOL/L (ref 0.4–2)
LACTATE SERPL-SCNC: 2.4 MMOL/L (ref 0.4–2)
LYMPHOCYTES # BLD: 3.7 K/UL (ref 0.8–3.5)
LYMPHOCYTES NFR BLD: 23.7 % (ref 12–49)
MAGNESIUM SERPL-MCNC: 1.6 MG/DL (ref 1.6–2.4)
MCH RBC QN AUTO: 27.4 PG (ref 26–34)
MCHC RBC AUTO-ENTMCNC: 34.2 G/DL (ref 30–36.5)
MCV RBC AUTO: 80 FL (ref 80–99)
MONOCYTES # BLD: 0.76 K/UL (ref 0–1)
MONOCYTES NFR BLD: 4.9 % (ref 5–13)
NEUTS SEG # BLD: 10.93 K/UL (ref 1.8–8)
NEUTS SEG NFR BLD: 70.2 % (ref 32–75)
NRBC # BLD: 0 K/UL (ref 0–0.01)
NRBC BLD-RTO: 0 PER 100 WBC
PLATELET # BLD AUTO: 220 K/UL (ref 150–400)
PMV BLD AUTO: 12.4 FL (ref 8.9–12.9)
POTASSIUM SERPL-SCNC: 4 MMOL/L (ref 3.5–5.1)
PROT SERPL-MCNC: 9.3 G/DL (ref 6.4–8.2)
RBC # BLD AUTO: 6.69 M/UL (ref 4.1–5.7)
SODIUM SERPL-SCNC: 134 MMOL/L (ref 136–145)
SPECIMEN HOLD: NORMAL
TROPONIN I SERPL HS-MCNC: 5 NG/L (ref 0–76)
WBC # BLD AUTO: 15.6 K/UL (ref 4.1–11.1)

## 2025-05-14 PROCEDURE — 93005 ELECTROCARDIOGRAM TRACING: CPT | Performed by: EMERGENCY MEDICINE

## 2025-05-14 PROCEDURE — 85025 COMPLETE CBC W/AUTO DIFF WBC: CPT

## 2025-05-14 PROCEDURE — G0378 HOSPITAL OBSERVATION PER HR: HCPCS

## 2025-05-14 PROCEDURE — 83605 ASSAY OF LACTIC ACID: CPT

## 2025-05-14 PROCEDURE — 80053 COMPREHEN METABOLIC PANEL: CPT

## 2025-05-14 PROCEDURE — 2580000003 HC RX 258: Performed by: EMERGENCY MEDICINE

## 2025-05-14 PROCEDURE — 6360000002 HC RX W HCPCS: Performed by: EMERGENCY MEDICINE

## 2025-05-14 PROCEDURE — 84484 ASSAY OF TROPONIN QUANT: CPT

## 2025-05-14 PROCEDURE — 71045 X-RAY EXAM CHEST 1 VIEW: CPT

## 2025-05-14 PROCEDURE — 99285 EMERGENCY DEPT VISIT HI MDM: CPT

## 2025-05-14 PROCEDURE — 93010 ELECTROCARDIOGRAM REPORT: CPT | Performed by: SPECIALIST

## 2025-05-14 PROCEDURE — 96375 TX/PRO/DX INJ NEW DRUG ADDON: CPT

## 2025-05-14 PROCEDURE — 83735 ASSAY OF MAGNESIUM: CPT

## 2025-05-14 PROCEDURE — 74177 CT ABD & PELVIS W/CONTRAST: CPT

## 2025-05-14 PROCEDURE — 76705 ECHO EXAM OF ABDOMEN: CPT

## 2025-05-14 PROCEDURE — 6360000004 HC RX CONTRAST MEDICATION: Performed by: INTERNAL MEDICINE

## 2025-05-14 PROCEDURE — 96374 THER/PROPH/DIAG INJ IV PUSH: CPT

## 2025-05-14 RX ORDER — ONDANSETRON 2 MG/ML
4 INJECTION INTRAMUSCULAR; INTRAVENOUS EVERY 6 HOURS PRN
Status: DISCONTINUED | OUTPATIENT
Start: 2025-05-14 | End: 2025-05-14 | Stop reason: HOSPADM

## 2025-05-14 RX ORDER — SODIUM CHLORIDE 0.9 % (FLUSH) 0.9 %
5-40 SYRINGE (ML) INJECTION PRN
Status: DISCONTINUED | OUTPATIENT
Start: 2025-05-14 | End: 2025-05-14 | Stop reason: HOSPADM

## 2025-05-14 RX ORDER — ONDANSETRON 4 MG/1
4 TABLET, ORALLY DISINTEGRATING ORAL EVERY 8 HOURS PRN
Status: DISCONTINUED | OUTPATIENT
Start: 2025-05-14 | End: 2025-05-14 | Stop reason: HOSPADM

## 2025-05-14 RX ORDER — 0.9 % SODIUM CHLORIDE 0.9 %
1000 INTRAVENOUS SOLUTION INTRAVENOUS ONCE
Status: COMPLETED | OUTPATIENT
Start: 2025-05-14 | End: 2025-05-14

## 2025-05-14 RX ORDER — SODIUM CHLORIDE 0.9 % (FLUSH) 0.9 %
5-40 SYRINGE (ML) INJECTION EVERY 12 HOURS SCHEDULED
Status: DISCONTINUED | OUTPATIENT
Start: 2025-05-14 | End: 2025-05-14 | Stop reason: HOSPADM

## 2025-05-14 RX ORDER — MORPHINE SULFATE 4 MG/ML
4 INJECTION, SOLUTION INTRAMUSCULAR; INTRAVENOUS
Status: COMPLETED | OUTPATIENT
Start: 2025-05-14 | End: 2025-05-14

## 2025-05-14 RX ORDER — ACETAMINOPHEN 325 MG/1
650 TABLET ORAL EVERY 6 HOURS PRN
Status: DISCONTINUED | OUTPATIENT
Start: 2025-05-14 | End: 2025-05-14 | Stop reason: HOSPADM

## 2025-05-14 RX ORDER — SODIUM CHLORIDE 9 MG/ML
INJECTION, SOLUTION INTRAVENOUS PRN
Status: DISCONTINUED | OUTPATIENT
Start: 2025-05-14 | End: 2025-05-14 | Stop reason: HOSPADM

## 2025-05-14 RX ORDER — ACETAMINOPHEN 650 MG/1
650 SUPPOSITORY RECTAL EVERY 6 HOURS PRN
Status: DISCONTINUED | OUTPATIENT
Start: 2025-05-14 | End: 2025-05-14 | Stop reason: HOSPADM

## 2025-05-14 RX ORDER — ONDANSETRON 2 MG/ML
4 INJECTION INTRAMUSCULAR; INTRAVENOUS
Status: COMPLETED | OUTPATIENT
Start: 2025-05-14 | End: 2025-05-14

## 2025-05-14 RX ORDER — IOPAMIDOL 755 MG/ML
100 INJECTION, SOLUTION INTRAVASCULAR
Status: COMPLETED | OUTPATIENT
Start: 2025-05-14 | End: 2025-05-14

## 2025-05-14 RX ORDER — POLYETHYLENE GLYCOL 3350 17 G/17G
17 POWDER, FOR SOLUTION ORAL DAILY PRN
Status: DISCONTINUED | OUTPATIENT
Start: 2025-05-14 | End: 2025-05-14 | Stop reason: HOSPADM

## 2025-05-14 RX ADMIN — ONDANSETRON 4 MG: 2 INJECTION, SOLUTION INTRAMUSCULAR; INTRAVENOUS at 09:34

## 2025-05-14 RX ADMIN — MORPHINE SULFATE 4 MG: 4 INJECTION INTRAVENOUS at 09:34

## 2025-05-14 RX ADMIN — IOPAMIDOL 100 ML: 755 INJECTION, SOLUTION INTRAVENOUS at 12:22

## 2025-05-14 RX ADMIN — SODIUM CHLORIDE 1000 ML: 0.9 INJECTION, SOLUTION INTRAVENOUS at 14:44

## 2025-05-14 RX ADMIN — SODIUM CHLORIDE 1000 ML: 9 INJECTION, SOLUTION INTRAVENOUS at 09:35

## 2025-05-14 ASSESSMENT — PAIN - FUNCTIONAL ASSESSMENT
PAIN_FUNCTIONAL_ASSESSMENT: ACTIVITIES ARE NOT PREVENTED
PAIN_FUNCTIONAL_ASSESSMENT: 0-10

## 2025-05-14 ASSESSMENT — PAIN DESCRIPTION - ORIENTATION
ORIENTATION: LEFT;RIGHT
ORIENTATION: LEFT;RIGHT;LOWER

## 2025-05-14 ASSESSMENT — PAIN SCALES - GENERAL
PAINLEVEL_OUTOF10: 7
PAINLEVEL_OUTOF10: 5
PAINLEVEL_OUTOF10: 0

## 2025-05-14 ASSESSMENT — PAIN DESCRIPTION - PAIN TYPE: TYPE: ACUTE PAIN

## 2025-05-14 ASSESSMENT — PAIN DESCRIPTION - FREQUENCY: FREQUENCY: INTERMITTENT

## 2025-05-14 ASSESSMENT — PAIN DESCRIPTION - LOCATION
LOCATION: HEAD;CHEST
LOCATION: ABDOMEN

## 2025-05-14 ASSESSMENT — PAIN DESCRIPTION - DESCRIPTORS: DESCRIPTORS: TINGLING;DISCOMFORT

## 2025-05-14 ASSESSMENT — PAIN DESCRIPTION - ONSET: ONSET: ON-GOING

## 2025-05-14 NOTE — ED PROVIDER NOTES
[facility] EMERGENCY DEPARTMENT  EMERGENCY DEPARTMENT ENCOUNTER      Pt Name: Gopi Flores  MRN: [id number]  Birthdate 1995  Date of evaluation: 5/14/2025  Provider: Daniel Garcia MD    CHIEF COMPLAINT       Chief Complaint   Patient presents with    Shortness of Breath    Numbness         HISTORY OF PRESENT ILLNESS   (Location/Symptom, Timing/Onset, Context/Setting, Quality, Duration, Modifying Factors, Severity)  Note limiting factors.   30-year-old male with PMHx of asthma, tobacco use, and marijuana use presents to the emergency department in significant distress with multiple complaints, including full body numbness, nausea, vomiting, abdominal pain x 2 days, and chest pain, shortness of breath, and a painful contracture of the left hand x 8 AM this morning.  He has no additional complaints at this time    The history is provided by the patient and the spouse.         Review of External Medical Records:     Nursing Notes were reviewed.    REVIEW OF SYSTEMS    (2-9 systems for level 4, 10 or more for level 5)     Review of Systems   Constitutional: Negative.    HENT: Negative.     Eyes: Negative.    Respiratory: Negative.     Cardiovascular:  Positive for chest pain.   Gastrointestinal:  Positive for abdominal pain, diarrhea, nausea and vomiting.   Genitourinary: Negative.    Musculoskeletal: Negative.    Skin: Negative.    Neurological:  Positive for numbness.   Psychiatric/Behavioral: Negative.         Except as noted above the remainder of the review of systems was reviewed and negative.       PAST MEDICAL HISTORY     Past Medical History:   Diagnosis Date    Asthma     history of asthma    Recurrent right knee instability 2/23/2015    Tobacco use disorder 2/18/2015         SURGICAL HISTORY       Past Surgical History:   Procedure Laterality Date    WRIST FRACTURE SURGERY Left     2011         CURRENT MEDICATIONS       There are no discharge medications for this patient.      ALLERGIES

## 2025-05-14 NOTE — ED NOTES
1430: Patient states he would like to leave the department. This RN explained to him that he was being admitted for his elevated bilirubin levels and if he were to leave it would be against medical advice.    1435: This RN messaged Dr. Garcia and explained the patient wanted to leave AMA.    1437: Patient insisted on leaving and signed out of the department AMA.    1440: AMA form witnessed and signed by Ashwini Walton & Brandie Chauhan RN

## 2025-05-14 NOTE — ED TRIAGE NOTES
Pt arrives from home with c/o chest pain, SOB and generalized numbness. Pt reports that on Monday he started having abd pain. Pt has been nauseous ever since and vomiting, unable to hold anything down. Pt started having chest pain this morning around 8 and feeling SOB. Pt started having generalized numbness on his way to the ED. Pt's left hand contracted which is new.

## 2025-05-15 ASSESSMENT — ENCOUNTER SYMPTOMS
NAUSEA: 1
RESPIRATORY NEGATIVE: 1
ABDOMINAL PAIN: 1
EYES NEGATIVE: 1
VOMITING: 1
DIARRHEA: 1

## 2025-05-16 ENCOUNTER — APPOINTMENT (OUTPATIENT)
Facility: HOSPITAL | Age: 30
End: 2025-05-16
Payer: COMMERCIAL

## 2025-05-16 ENCOUNTER — TELEPHONE (OUTPATIENT)
Age: 30
End: 2025-05-16

## 2025-05-16 ENCOUNTER — HOSPITAL ENCOUNTER (EMERGENCY)
Facility: HOSPITAL | Age: 30
Discharge: HOME | End: 2025-05-16
Attending: EMERGENCY MEDICINE
Payer: COMMERCIAL

## 2025-05-16 VITALS
DIASTOLIC BLOOD PRESSURE: 73 MMHG | WEIGHT: 225.09 LBS | BODY MASS INDEX: 27.4 KG/M2 | HEART RATE: 72 BPM | TEMPERATURE: 98.2 F | SYSTOLIC BLOOD PRESSURE: 123 MMHG | RESPIRATION RATE: 26 BRPM | OXYGEN SATURATION: 95 %

## 2025-05-16 DIAGNOSIS — F41.1 ANXIETY STATE: ICD-10-CM

## 2025-05-16 DIAGNOSIS — R10.13 ABDOMINAL PAIN, EPIGASTRIC: Primary | ICD-10-CM

## 2025-05-16 LAB
ALBUMIN SERPL-MCNC: 4.1 G/DL (ref 3.5–5)
ALBUMIN/GLOB SERPL: 0.9 (ref 1.1–2.2)
ALP SERPL-CCNC: 111 U/L (ref 45–117)
ALT SERPL-CCNC: 29 U/L (ref 12–78)
ANION GAP SERPL CALC-SCNC: 8 MMOL/L (ref 2–12)
AST SERPL-CCNC: 32 U/L (ref 15–37)
BASOPHILS # BLD: 0.04 K/UL (ref 0–0.1)
BASOPHILS NFR BLD: 0.4 % (ref 0–1)
BILIRUB SERPL-MCNC: 2.2 MG/DL (ref 0.2–1)
BUN SERPL-MCNC: 14 MG/DL (ref 6–20)
BUN/CREAT SERPL: 11 (ref 12–20)
CALCIUM SERPL-MCNC: 9.9 MG/DL (ref 8.5–10.1)
CHLORIDE SERPL-SCNC: 104 MMOL/L (ref 97–108)
CO2 SERPL-SCNC: 18 MMOL/L (ref 21–32)
COMMENT:: NORMAL
CREAT SERPL-MCNC: 1.29 MG/DL (ref 0.7–1.3)
DIFFERENTIAL METHOD BLD: ABNORMAL
EKG ATRIAL RATE: 73 BPM
EKG DIAGNOSIS: NORMAL
EKG P AXIS: 76 DEGREES
EKG P-R INTERVAL: 148 MS
EKG Q-T INTERVAL: 368 MS
EKG QRS DURATION: 88 MS
EKG QTC CALCULATION (BAZETT): 405 MS
EKG R AXIS: 59 DEGREES
EKG T AXIS: 81 DEGREES
EKG VENTRICULAR RATE: 73 BPM
EOSINOPHIL # BLD: 0.11 K/UL (ref 0–0.4)
EOSINOPHIL NFR BLD: 1.1 % (ref 0–7)
ERYTHROCYTE [DISTWIDTH] IN BLOOD BY AUTOMATED COUNT: 15 % (ref 11.5–14.5)
GLOBULIN SER CALC-MCNC: 4.6 G/DL (ref 2–4)
GLUCOSE SERPL-MCNC: 105 MG/DL (ref 65–100)
HCT VFR BLD AUTO: 54.6 % (ref 36.6–50.3)
HGB BLD-MCNC: 18.1 G/DL (ref 12.1–17)
IMM GRANULOCYTES # BLD AUTO: 0.05 K/UL (ref 0–0.04)
IMM GRANULOCYTES NFR BLD AUTO: 0.5 % (ref 0–0.5)
LIPASE SERPL-CCNC: 28 U/L (ref 13–75)
LYMPHOCYTES # BLD: 2.72 K/UL (ref 0.8–3.5)
LYMPHOCYTES NFR BLD: 26.2 % (ref 12–49)
MCH RBC QN AUTO: 27.5 PG (ref 26–34)
MCHC RBC AUTO-ENTMCNC: 33.2 G/DL (ref 30–36.5)
MCV RBC AUTO: 82.9 FL (ref 80–99)
MONOCYTES # BLD: 0.86 K/UL (ref 0–1)
MONOCYTES NFR BLD: 8.3 % (ref 5–13)
NEUTS SEG # BLD: 6.62 K/UL (ref 1.8–8)
NEUTS SEG NFR BLD: 63.5 % (ref 32–75)
NRBC # BLD: 0 K/UL (ref 0–0.01)
NRBC BLD-RTO: 0 PER 100 WBC
PLATELET # BLD AUTO: 150 K/UL (ref 150–400)
POTASSIUM SERPL-SCNC: 4.1 MMOL/L (ref 3.5–5.1)
PROT SERPL-MCNC: 8.7 G/DL (ref 6.4–8.2)
RBC # BLD AUTO: 6.59 M/UL (ref 4.1–5.7)
RBC MORPH BLD: ABNORMAL
SODIUM SERPL-SCNC: 130 MMOL/L (ref 136–145)
SPECIMEN HOLD: NORMAL
TROPONIN I SERPL HS-MCNC: 4 NG/L (ref 0–76)
WBC # BLD AUTO: 10.4 K/UL (ref 4.1–11.1)

## 2025-05-16 PROCEDURE — 85025 COMPLETE CBC W/AUTO DIFF WBC: CPT

## 2025-05-16 PROCEDURE — 83690 ASSAY OF LIPASE: CPT

## 2025-05-16 PROCEDURE — 80053 COMPREHEN METABOLIC PANEL: CPT

## 2025-05-16 PROCEDURE — 84484 ASSAY OF TROPONIN QUANT: CPT

## 2025-05-16 PROCEDURE — 71046 X-RAY EXAM CHEST 2 VIEWS: CPT

## 2025-05-16 PROCEDURE — 93010 ELECTROCARDIOGRAM REPORT: CPT | Performed by: SPECIALIST

## 2025-05-16 PROCEDURE — 6360000002 HC RX W HCPCS: Performed by: EMERGENCY MEDICINE

## 2025-05-16 PROCEDURE — 96374 THER/PROPH/DIAG INJ IV PUSH: CPT

## 2025-05-16 PROCEDURE — 2580000003 HC RX 258: Performed by: EMERGENCY MEDICINE

## 2025-05-16 PROCEDURE — 93005 ELECTROCARDIOGRAM TRACING: CPT | Performed by: EMERGENCY MEDICINE

## 2025-05-16 PROCEDURE — 96375 TX/PRO/DX INJ NEW DRUG ADDON: CPT

## 2025-05-16 PROCEDURE — 99285 EMERGENCY DEPT VISIT HI MDM: CPT

## 2025-05-16 RX ORDER — 0.9 % SODIUM CHLORIDE 0.9 %
1000 INTRAVENOUS SOLUTION INTRAVENOUS ONCE
Status: COMPLETED | OUTPATIENT
Start: 2025-05-16 | End: 2025-05-16

## 2025-05-16 RX ORDER — DROPERIDOL 2.5 MG/ML
0.62 INJECTION, SOLUTION INTRAMUSCULAR; INTRAVENOUS ONCE
Status: COMPLETED | OUTPATIENT
Start: 2025-05-16 | End: 2025-05-16

## 2025-05-16 RX ORDER — ONDANSETRON 4 MG/1
4 TABLET, ORALLY DISINTEGRATING ORAL 3 TIMES DAILY PRN
Qty: 21 TABLET | Refills: 0 | Status: SHIPPED | OUTPATIENT
Start: 2025-05-16

## 2025-05-16 RX ORDER — ONDANSETRON 2 MG/ML
4 INJECTION INTRAMUSCULAR; INTRAVENOUS ONCE
Status: COMPLETED | OUTPATIENT
Start: 2025-05-16 | End: 2025-05-16

## 2025-05-16 RX ORDER — DIAZEPAM 5 MG/1
5 TABLET ORAL EVERY 6 HOURS PRN
Qty: 6 TABLET | Refills: 0 | Status: SHIPPED | OUTPATIENT
Start: 2025-05-16 | End: 2025-05-26

## 2025-05-16 RX ADMIN — ONDANSETRON 4 MG: 2 INJECTION, SOLUTION INTRAMUSCULAR; INTRAVENOUS at 06:05

## 2025-05-16 RX ADMIN — SODIUM CHLORIDE 1000 ML: 0.9 INJECTION, SOLUTION INTRAVENOUS at 08:05

## 2025-05-16 RX ADMIN — SODIUM CHLORIDE 1000 ML: 9 INJECTION, SOLUTION INTRAVENOUS at 06:18

## 2025-05-16 RX ADMIN — DROPERIDOL 0.62 MG: 2.5 INJECTION, SOLUTION INTRAMUSCULAR; INTRAVENOUS at 06:06

## 2025-05-16 ASSESSMENT — ENCOUNTER SYMPTOMS
ABDOMINAL PAIN: 1
VOMITING: 1
NAUSEA: 1
COUGH: 0
SHORTNESS OF BREATH: 0

## 2025-05-16 ASSESSMENT — LIFESTYLE VARIABLES
HOW MANY STANDARD DRINKS CONTAINING ALCOHOL DO YOU HAVE ON A TYPICAL DAY: PATIENT DOES NOT DRINK
HOW OFTEN DO YOU HAVE A DRINK CONTAINING ALCOHOL: NEVER

## 2025-05-16 ASSESSMENT — PAIN SCALES - GENERAL: PAINLEVEL_OUTOF10: 10

## 2025-05-16 NOTE — DISCHARGE INSTRUCTIONS
You were seen in the emergency department for nausea and vomiting.  The results of your tests were notable for a mildly elevated bilirubin, but were otherwise reassuring.  Please follow-up with general surgery and a GI specialist.  Please also take any medications prescribed at this visit as instructed.  Please hydrate well and cease from smoking marijuana over the next few weeks.  Please also follow-up with your PCP or return to the emergency department if you experience a worsening of symptoms or any new symptoms that are concerning to you.

## 2025-05-16 NOTE — ED PROVIDER NOTES
[facility] EMERGENCY DEPARTMENT  EMERGENCY DEPARTMENT ENCOUNTER      Pt Name: Gopi Flores  MRN: [id number]  Birthdate 1995  Date of evaluation: 5/16/2025  Provider: Pao Quiroz MD    CHIEF COMPLAINT       Chief Complaint   Patient presents with    Shortness of Breath    Chest Pain    Abdominal Pain         HISTORY OF PRESENT ILLNESS    HPI    Gopi Flores is a 30 y.o. male with PMH significant for asthma, tobacco use, marijuana use, occasional alcohol use who presents to the emergency department with complaints of epigastric abdominal pain starting 2 days ago with nausea and vomiting and shortness of breath.  Patient in emergency department for same 2 days ago and was set to be admitted for hyperbilirubinemia though left AMA.  Denies fever, chills.  On questioning patient reports constipation and decreased urine output.  Nursing Notes were reviewed.    REVIEW OF SYSTEMS       Review of Systems   Constitutional:  Negative for chills and fever.   Respiratory:  Negative for cough and shortness of breath.    Cardiovascular:  Negative for chest pain.   Gastrointestinal:  Positive for abdominal pain, nausea and vomiting.   Genitourinary:  Positive for decreased urine volume. Negative for difficulty urinating.   Skin:  Negative for wound.   Neurological:  Negative for headaches.   Psychiatric/Behavioral:  Negative for suicidal ideas.            PAST MEDICAL HISTORY     Past Medical History:   Diagnosis Date    Asthma     history of asthma    Recurrent right knee instability 2/23/2015    Tobacco use disorder 2/18/2015         SURGICAL HISTORY       Past Surgical History:   Procedure Laterality Date    WRIST FRACTURE SURGERY Left     2011         CURRENT MEDICATIONS       Previous Medications    No medications on file       ALLERGIES     Patient has no known allergies.    FAMILY HISTORY       Family History   Problem Relation Age of Onset    Diabetes Maternal Grandfather     Diabetes Maternal Grandmother

## 2025-05-16 NOTE — ED TRIAGE NOTES
Patient arrives c/o abdominal pain, chest pain, and shortness of breath.     Patient states he was seen yesterday and left AMA. Patient was going to be admitted but did not want to wait.    Patient states he is willing to stay now.     10/10 pain at this time. States he vomited about an hour ago.